# Patient Record
Sex: FEMALE | Race: WHITE | Employment: FULL TIME | URBAN - METROPOLITAN AREA
[De-identification: names, ages, dates, MRNs, and addresses within clinical notes are randomized per-mention and may not be internally consistent; named-entity substitution may affect disease eponyms.]

---

## 2019-07-31 ENCOUNTER — TELEPHONE (OUTPATIENT)
Dept: NEUROLOGY | Facility: CLINIC | Age: 44
End: 2019-07-31

## 2019-08-13 ENCOUNTER — TELEPHONE (OUTPATIENT)
Dept: NEUROLOGY | Facility: CLINIC | Age: 44
End: 2019-08-13

## 2019-08-15 NOTE — TELEPHONE ENCOUNTER
Patient left message on voicemail stating she speaks to Dr. Angel Wilder on his cell phone weekly and Dr. Angel Wilder is not sure why office is not calling back to schedule a sooner apt.  Patient expressed she has called the office multiple times and left 3 messages for

## 2019-08-27 ENCOUNTER — TELEPHONE (OUTPATIENT)
Dept: NEUROLOGY | Facility: CLINIC | Age: 44
End: 2019-08-27

## 2019-08-27 ENCOUNTER — OFFICE VISIT (OUTPATIENT)
Dept: NEUROLOGY | Facility: CLINIC | Age: 44
End: 2019-08-27
Payer: COMMERCIAL

## 2019-08-27 VITALS
RESPIRATION RATE: 16 BRPM | HEART RATE: 72 BPM | HEIGHT: 66 IN | WEIGHT: 150 LBS | BODY MASS INDEX: 24.11 KG/M2 | SYSTOLIC BLOOD PRESSURE: 100 MMHG | DIASTOLIC BLOOD PRESSURE: 68 MMHG

## 2019-08-27 DIAGNOSIS — M54.16 LUMBAR RADICULOPATHY: Primary | ICD-10-CM

## 2019-08-27 DIAGNOSIS — M51.26 LUMBAR HERNIATED DISC: ICD-10-CM

## 2019-08-27 DIAGNOSIS — M51.9 LUMBAR DISC DISEASE: ICD-10-CM

## 2019-08-27 PROCEDURE — 99244 OFF/OP CNSLTJ NEW/EST MOD 40: CPT | Performed by: PHYSICAL MEDICINE & REHABILITATION

## 2019-08-27 RX ORDER — GABAPENTIN 800 MG/1
800 TABLET ORAL
COMMUNITY
Start: 2019-07-30 | End: 2020-06-23

## 2019-08-27 RX ORDER — DEXLANSOPRAZOLE 60 MG/1
60 CAPSULE, DELAYED RELEASE ORAL DAILY
COMMUNITY
End: 2020-06-23

## 2019-08-27 RX ORDER — HYDROCODONE BITARTRATE AND ACETAMINOPHEN 5; 325 MG/1; MG/1
1 TABLET ORAL EVERY 6 HOURS PRN
COMMUNITY
Start: 2019-07-30 | End: 2020-06-23

## 2019-08-27 NOTE — PATIENT INSTRUCTIONS
Plan  She will take 400 mg of the Neurontin at night time for the ain and insomnia. I will do a left S1 TFESI. She will need to get back into the PT once she has had the injection. She will continue with the Norco as needed.     The patient will

## 2019-08-27 NOTE — TELEPHONE ENCOUNTER
Patient has been scheduled for a Left S1 TFESI under local on 9/10/19 at the [facility]. Medications and allergies reviewed. Patient informed to hold aspirins, nsaids, blood thinners, vitamins and fish oils 3-7 days prior to procedure.  Patient informed to hold m

## 2019-08-27 NOTE — PROGRESS NOTES
Low Back Pain H & P    Chief Complaint:  Patient presents with:  Low Back Pain: Patient presents for low back pain for the past year and half, denied any injury.  Patient c/o pain on both sides of back, sitting worsens pain, radiates down back and inner lef helped the left leg pain but it made her angry. She stopped taking it about 1 week ago. She also had memory issues with it. She would have GI upset with the Norco which she has taken when the pain is really bad.   On July 30th, she saw Dr. 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        The patient does live in a home with stairs. Review of Systems  Constitutional:   Negative for chills, fatigue, fever, night sweats, but weight gain of 60 lbs over the last year. She has had some sweating.   ENT:   Negative for hearing lo Non-tender for all Spinous Processes   Z-joints: Non-tender for all Z-joints   SIJ: Non-tender for bilateral SIJ   Piriformis Muscle: Non-tender bilateral Piriformis muscles   Greater Trochanteric Bursa: Non-tender for bilateral Greater Trochanteric Bursa Radiology Imaging:  I reviewed her MRI of the lumbar spine from 7/16/19. Assessment  1. left S1 radiculopathy    2. L5-S1 left moderate herniated disc    3.  L3-4 mild central, L4-5 mild-mod central bulging discs      Plan  She will take 400 mg of

## 2019-08-27 NOTE — TELEPHONE ENCOUNTER
Called  [facility] for authorization of approval of Left S1 TFESI cpt codes [id number], [id number]. Talked to Taty Felder.  who states no authorization is required. Reference #  [id number]. Will  inform Nursing.

## 2019-08-28 ENCOUNTER — TELEPHONE (OUTPATIENT)
Dept: NEUROLOGY | Facility: CLINIC | Age: 44
End: 2019-08-28

## 2019-08-28 NOTE — TELEPHONE ENCOUNTER
Patient left voicemail requesting appt this Friday 8/30/2019 for left S1 TFESI.   Patient is currently scheduled 9/10/2019  There are no openings on 8/30/19    Will notify patient tomorrow

## 2019-08-30 ENCOUNTER — MED REC SCAN ONLY (OUTPATIENT)
Dept: NEUROLOGY | Facility: CLINIC | Age: 44
End: 2019-08-30

## 2019-09-10 ENCOUNTER — OFFICE VISIT (OUTPATIENT)
Dept: SURGERY | Facility: CLINIC | Age: 44
End: 2019-09-10
Payer: COMMERCIAL

## 2019-09-10 DIAGNOSIS — M51.26 LUMBAR HERNIATED DISC: ICD-10-CM

## 2019-09-10 DIAGNOSIS — M54.16 LUMBAR RADICULOPATHY: Primary | ICD-10-CM

## 2019-09-10 PROCEDURE — 64483 NJX AA&/STRD TFRM EPI L/S 1: CPT | Performed by: PHYSICAL MEDICINE & REHABILITATION

## 2019-09-10 NOTE — PROCEDURES
Andrea GANDHI 7.    LUMBAR TRANSFORAMINAL   NAME:  Yasir Young    MR #:    [id number] :  1975     PHYSICIAN:  Rosanna Anderson        Operative Report    DATE OF PROCEDURE: 9/10/2019   PREOPERATIVE DIAGNOSES: 1. left S1 radiculopath aspiration was performed. No blood, fluid, or air was aspirated at anytime.

## 2020-05-12 ENCOUNTER — TELEPHONE (OUTPATIENT)
Dept: PHYSICAL THERAPY | Facility: HOSPITAL | Age: 45
End: 2020-05-12

## 2020-05-12 ENCOUNTER — TELEPHONE (OUTPATIENT)
Dept: PHYSICAL THERAPY | Age: 45
End: 2020-05-12

## 2020-05-12 NOTE — TELEPHONE ENCOUNTER
Returned pt's call. Scheduled visits for PT at [place] [address]. She states that she will contact Dr. Anne Beltran to request an updated script prior to her PT eval on 5/18/20.

## 2020-05-13 ENCOUNTER — TELEMEDICINE (OUTPATIENT)
Dept: NEUROLOGY | Facility: CLINIC | Age: 45
End: 2020-05-13
Payer: COMMERCIAL

## 2020-05-13 ENCOUNTER — ORDER TRANSCRIPTION (OUTPATIENT)
Dept: PHYSICAL THERAPY | Age: 45
End: 2020-05-13

## 2020-05-13 DIAGNOSIS — M54.16 LUMBAR RADICULOPATHY: ICD-10-CM

## 2020-05-13 DIAGNOSIS — M51.26 LUMBAR HERNIATED DISC: ICD-10-CM

## 2020-05-13 DIAGNOSIS — M51.26 LUMBAR DISC HERNIATION: ICD-10-CM

## 2020-05-13 DIAGNOSIS — M54.2 NECK PAIN ON RIGHT SIDE: ICD-10-CM

## 2020-05-13 DIAGNOSIS — M51.9 LUMBAR DISC DISEASE: ICD-10-CM

## 2020-05-13 DIAGNOSIS — M51.16 RADICULOPATHY DUE TO DISORDER OF INTERVERTEBRAL DISC OF LUMBAR SPINE: ICD-10-CM

## 2020-05-13 DIAGNOSIS — M54.16 LUMBAR RADICULOPATHY: Primary | ICD-10-CM

## 2020-05-13 PROCEDURE — 99214 OFFICE O/P EST MOD 30 MIN: CPT | Performed by: PHYSICAL MEDICINE & REHABILITATION

## 2020-05-13 NOTE — PATIENT INSTRUCTIONS
PLAN:   She will start PT on the lumbar and the cervical spines. Since the cervical spine pain is more mild, I will have her try the PT before having her get cervical spine x-rays.   If the PT is not helping her, then she will need to have the x-rays and

## 2020-05-13 NOTE — PROGRESS NOTES
Renetta [address]anilla 89  [facility]    Telemedicine Visit - Evaluation    Mindy Guidry verbally consents to a Telemedicine Visit on 05/13/20. This visit is conducted using Telemedicine with live, interactive audio and video.     Maryjo SURGICAL HISTORY:     Past Surgical History:   Procedure Laterality Date   • D & C  2000   • D & C  2007         CURRENT MEDICATIONS:     Current Outpatient Medications   Medication Sig Dispense Refill   • Dexlansoprazole 60 MG Oral Capsule Delayed Release difficulty   LEFT Walking on Heels: no difficulty     Lumbar Spine:    Scoliosis: No scoliosis present   Lumbar Flexion: 80 degrees Gives the patient pain in the bilateral low back.    Lumbar Extension: 20 degrees Relieves pain     Special Tests Lumbar Spin on right side        PLAN:   She will start PT on the lumbar and the cervical spines. Since the cervical spine pain is more mild, I will have her try the PT before having her get cervical spine x-rays.   If the PT is not helping her, then she will need t platform. The patient was made aware of where to find [facility] notice of privacy practices, telehealth consent form and other related consent forms and documents. which are located on the [facility] website.  The patient verbally agreed to telehealth consent form,

## 2020-05-18 ENCOUNTER — OFFICE VISIT (OUTPATIENT)
Dept: PHYSICAL THERAPY | Age: 45
End: 2020-05-18
Attending: PHYSICAL MEDICINE & REHABILITATION
Payer: COMMERCIAL

## 2020-05-18 DIAGNOSIS — M54.16 LUMBAR RADICULOPATHY: ICD-10-CM

## 2020-05-18 DIAGNOSIS — M51.9 LUMBAR DISC DISEASE: ICD-10-CM

## 2020-05-18 DIAGNOSIS — M51.26 LUMBAR DISC HERNIATION: ICD-10-CM

## 2020-05-18 PROCEDURE — 97530 THERAPEUTIC ACTIVITIES: CPT

## 2020-05-18 PROCEDURE — 97110 THERAPEUTIC EXERCISES: CPT

## 2020-05-18 PROCEDURE — 97163 PT EVAL HIGH COMPLEX 45 MIN: CPT

## 2020-05-18 NOTE — PROGRESS NOTES
SPINE EVALUATION:   Referring Physician: Dr. Jennifer Weston  Diagnosis:   Lumbar radiculopathy (M54.16)  Lumbar disc disease (M51.9)  Lumbar herniated disc (M51.26)  Neck pain on right side (M54.2) Date of Service: 5/18/2020     PATIENT SUMMARY   [facility] is a feels stiff. Last year, she would have numbness that would shoot down to her fingers. She denies radiating symptoms down her arms currently. She reports that she has a hx of migraines for many years.  The migraines stopped about 2-3 years ago, and have rece posture and muscle imbalance. She also appears to present with signs and symptoms that may be consistent with R sacroiliac joint dysfunction. Pt and PT discussed evaluation findings, pathology, POC and HEP.   Pt voiced understanding and performs HEP correc pt ambulates on level ground with decreased hip extension      Today’s Treatment and Response: following repeated lumbar ext, pt demonstrated increased lumbar flexion ROM with decreased pain.  Following cervical retraction, pt showed increased B cervical ro 8 visits over a 90 day period.  Treatment will include: Gait training, Manual Therapy, Neuromuscular Re-education, Self-Care Home Management, Therapeutic Activities, Therapeutic Exercise, Home Exercise Program instruction and Modalities to include: Electric

## 2020-05-21 ENCOUNTER — APPOINTMENT (OUTPATIENT)
Dept: PHYSICAL THERAPY | Age: 45
End: 2020-05-21
Attending: PHYSICAL MEDICINE & REHABILITATION
Payer: COMMERCIAL

## 2020-05-22 ENCOUNTER — OFFICE VISIT (OUTPATIENT)
Dept: PHYSICAL THERAPY | Age: 45
End: 2020-05-22
Attending: PHYSICAL MEDICINE & REHABILITATION
Payer: COMMERCIAL

## 2020-05-22 PROCEDURE — 97110 THERAPEUTIC EXERCISES: CPT

## 2020-05-22 PROCEDURE — 97112 NEUROMUSCULAR REEDUCATION: CPT

## 2020-05-22 PROCEDURE — 97140 MANUAL THERAPY 1/> REGIONS: CPT

## 2020-05-22 NOTE — PROGRESS NOTES
Dx:    Lumbar radiculopathy (M54.16)  Lumbar disc disease (M51.9)  Lumbar herniated disc (M51.26)  Neck pain on right side (M54.2)      Insurance (Authorized # of Visits):  BCBS PPO; 8 visits; Cert ends 2/21/0313       Authorizing Physician: Dr. Kevon Dumont diaphragmatic breathing training x 6 min  TrA activation with cueing x 5 min  Gait training with cues to activate TrA and glutes                HEP: prone press ups; hip add ball; hip abd tband    Charges: 1 TE, 1 MT, 1 NMR     Total Timed Treatment: 45 mi

## 2020-05-28 ENCOUNTER — OFFICE VISIT (OUTPATIENT)
Dept: PHYSICAL THERAPY | Age: 45
End: 2020-05-28
Attending: INTERNAL MEDICINE
Payer: COMMERCIAL

## 2020-05-28 PROCEDURE — 97140 MANUAL THERAPY 1/> REGIONS: CPT

## 2020-05-28 PROCEDURE — 97112 NEUROMUSCULAR REEDUCATION: CPT

## 2020-05-28 PROCEDURE — 97110 THERAPEUTIC EXERCISES: CPT

## 2020-05-28 NOTE — PROGRESS NOTES
Dx:    Lumbar radiculopathy (M54.16)  Lumbar disc disease (M51.9)  Lumbar herniated disc (M51.26)  Neck pain on right side (M54.2)      Insurance (Authorized # of Visits):  BCBS PPO; 8 visits; Cert ends 3/88/2196       Authorizing Physician: Dr. Rachael Sheets MD TX#: 4/ Date:                 TX#: 5/ Date:    Tx#: 6/   There Ex  Prone press ups; 10x2  Hip add ball 10x5\", 2 sets  Hip abd tband: 10x2 There Ex  Prone press ups; 10x1  Prone press ups with \"sag\" 10x1  Hip add ball 10x5\", 2 sets  Hip ab

## 2020-06-01 ENCOUNTER — OFFICE VISIT (OUTPATIENT)
Dept: PHYSICAL THERAPY | Age: 45
End: 2020-06-01
Attending: PHYSICAL MEDICINE & REHABILITATION
Payer: COMMERCIAL

## 2020-06-01 PROCEDURE — 97110 THERAPEUTIC EXERCISES: CPT

## 2020-06-01 PROCEDURE — 97140 MANUAL THERAPY 1/> REGIONS: CPT

## 2020-06-01 NOTE — PROGRESS NOTES
Dx:    Lumbar radiculopathy (M54.16)  Lumbar disc disease (M51.9)  Lumbar herniated disc (M51.26)  Neck pain on right side (M54.2)      Insurance (Authorized # of Visits):  BCBS PPO; 8 visits; Cert ends 6/33/5679       Authorizing Physician: Dr. Jocy Gan MD working progress. Plan: Focus on pain reduction and continue with core stabilization. Date: 5/22/2020  TX#: 2/8 Date: 5/28/2020                TX#: 3/8 Date:  6/1/2020               TX#: 4/8 Date:                 TX#: 5/ Date:    Tx#: 6/ There Ex  Pr

## 2020-06-02 ENCOUNTER — APPOINTMENT (OUTPATIENT)
Dept: PHYSICAL THERAPY | Age: 45
End: 2020-06-02
Payer: COMMERCIAL

## 2020-06-05 ENCOUNTER — OFFICE VISIT (OUTPATIENT)
Dept: PHYSICAL THERAPY | Age: 45
End: 2020-06-05
Attending: INTERNAL MEDICINE
Payer: COMMERCIAL

## 2020-06-05 PROCEDURE — 97110 THERAPEUTIC EXERCISES: CPT

## 2020-06-05 PROCEDURE — 97140 MANUAL THERAPY 1/> REGIONS: CPT

## 2020-06-05 NOTE — PROGRESS NOTES
Dx:    Lumbar radiculopathy (M54.16)  Lumbar disc disease (M51.9)  Lumbar herniated disc (M51.26)  Neck pain on right side (M54.2)      Insurance (Authorized # of Visits):  BCBS PPO; 8 visits; Cert ends 5/11/3513       Authorizing Physician: Dr. Viann Meckel, MD (C7): R 5/5, L 5/5  Wrist Flex (C7): R 5/5, L 5/5  Digit Flex (C8): R 5/5, L 5/5  Thumb Ext (C8): R 5/5, L 4/5  Interossei (T1): R 5/5, L 4/5     Rhomboids: R 4/5, L 4/5  Mid trap: R 3/5; L 4/5  Lats: R NT/5, L NT/5  Low trap: R NT/5; L NT/5    Hip flexion had demonstrated signs of lumbar extension rotation syndrome with c/o pain. When providing manual cues to attain neutral lumbopelvic alignment, pt is able to perform exercises and ambulate without pain.  Her cervical symptoms have improved but she does demo sign and return this letter via fax as soon as possible to [phone]. I certify the need for these services furnished under this plan of treatment and while under my care.     X___________________________________________________ Date___________________ and glutes  Supine: BKFO 20x            HEP: prone press ups; hip add ball; hip abd tband; BKFO; sciatic n.  Neuromob; bridge progression    Charges: 2 TE, 1 MT    Total Timed Treatment: 45 min  Total Treatment Time: 45 min

## 2020-06-08 ENCOUNTER — OFFICE VISIT (OUTPATIENT)
Dept: PHYSICAL THERAPY | Age: 45
End: 2020-06-08
Attending: PHYSICAL MEDICINE & REHABILITATION
Payer: COMMERCIAL

## 2020-06-08 PROCEDURE — 97110 THERAPEUTIC EXERCISES: CPT

## 2020-06-08 PROCEDURE — 97140 MANUAL THERAPY 1/> REGIONS: CPT

## 2020-06-08 NOTE — PROGRESS NOTES
Dx:    Lumbar radiculopathy (M54.16)  Lumbar disc disease (M51.9)  Lumbar herniated disc (M51.26)  Neck pain on right side (M54.2)      Insurance (Authorized # of Visits):  BCBS PPO; 13 visits; Cert ends 4/2/6187       Authorizing Physician: Dr. Jennifer Weston MD 5/5  Knee Flexion: R 4/5; L 5/5            Knee extension (L3): R 4+/5; L 5/5            DF (L4): R 4/5; L 5/5  Great Toe Ext (L5): R 4/5, L 5/5  PF (S1): R 4+/5; L 5/5      Strength: (* denotes performed with pain)  UE/Scapular 6/5/2020 LE 6/5/2020   Shou TX#: 5/8 Date: 6/8/2020  Tx#: 6/15   There Ex  Prone press ups; 10x2  Hip add ball 10x5\", 2 sets  Hip abd tband: 10x2 There Ex  Prone press ups; 10x1  Prone press ups with \"sag\" 10x1  Hip add ball 10x5\", 2 sets  Hip abd tband: 10x2  Sciatic N.  Neuro ups; hip add ball; hip abd tband; BKFO; sciatic n.  Neuromob; bridge progression    Charges: 2 TE, 1 MT    Total Timed Treatment: 45 min  Total Treatment Time: 45 min

## 2020-06-09 ENCOUNTER — APPOINTMENT (OUTPATIENT)
Dept: PHYSICAL THERAPY | Age: 45
End: 2020-06-09
Payer: COMMERCIAL

## 2020-06-11 ENCOUNTER — OFFICE VISIT (OUTPATIENT)
Dept: PHYSICAL THERAPY | Age: 45
End: 2020-06-11
Attending: PHYSICAL MEDICINE & REHABILITATION
Payer: COMMERCIAL

## 2020-06-11 PROCEDURE — 97110 THERAPEUTIC EXERCISES: CPT

## 2020-06-11 PROCEDURE — 97140 MANUAL THERAPY 1/> REGIONS: CPT

## 2020-06-11 NOTE — PROGRESS NOTES
Dx:    Lumbar radiculopathy (M54.16)  Lumbar disc disease (M51.9)  Lumbar herniated disc (M51.26)  Neck pain on right side (M54.2)      Insurance (Authorized # of Visits):  BCBS PPO; 13 visits; Cert ends 6/6/0539       Authorizing Physician: Dr. Tae Valdes MD 5/5  Shoulder ABD (C5): R 5/5, L 5/5  Biceps (C6): R 5/5, L 5/5  Wrist ext (C6): R 5/5, L 5/5  Triceps (C7): R 5/5, L 5/5  Wrist Flex (C7): R 5/5, L 5/5  Digit Flex (C8): R 5/5, L 5/5  Thumb Ext (C8): R 5/5, L 4/5  Interossei (T1): R 5/5, L 4/5     Rhomboi ball with bridging 10x5\", 2 sets  Hip abd tband: 10x2  Supine: Sciatic N.  Neuromob 20xB Ther Ex  Prone: glut med heel squeeze 10x2  Prone press ups with \"sag\" 10x1  Prone press ups with therapist OP 10x1  Hip add ball with bridging 10x5\", 2 sets  Hip a Treatment Time: 45 min

## 2020-06-15 ENCOUNTER — OFFICE VISIT (OUTPATIENT)
Dept: PHYSICAL THERAPY | Age: 45
End: 2020-06-15
Attending: PHYSICAL MEDICINE & REHABILITATION
Payer: COMMERCIAL

## 2020-06-15 PROCEDURE — 97110 THERAPEUTIC EXERCISES: CPT

## 2020-06-15 PROCEDURE — 97140 MANUAL THERAPY 1/> REGIONS: CPT

## 2020-06-18 ENCOUNTER — OFFICE VISIT (OUTPATIENT)
Dept: PHYSICAL THERAPY | Age: 45
End: 2020-06-18
Attending: PHYSICAL MEDICINE & REHABILITATION
Payer: COMMERCIAL

## 2020-06-18 PROCEDURE — 97110 THERAPEUTIC EXERCISES: CPT

## 2020-06-18 PROCEDURE — 97112 NEUROMUSCULAR REEDUCATION: CPT

## 2020-06-18 NOTE — PROGRESS NOTES
Dx:    Lumbar radiculopathy (M54.16)  Lumbar disc disease (M51.9)  Lumbar herniated disc (M51.26)  Neck pain on right side (M54.2)      Insurance (Authorized # of Visits):  BCBS PPO; 13 visits; Cert ends 2/6/6275       Authorizing Physician: Dr. Marli Guzman MD R 4/5, L 5/5  PF (S1): R 4+/5; L 5/5      Strength: (* denotes performed with pain)  UE/Scapular 6/5/2020 LE 6/5/2020   Shoulder Flex: R 5/5, L 5/5  Shoulder ABD (C5): R 5/5, L 5/5  Biceps (C6): R 5/5, L 5/5  Wrist ext (C6): R 5/5, L 5/5  Triceps (C7): R 5 6/5/2020: reviewed goals with pt. Goals are in working progress. Plan: Progress core/scapular stabilization exercises.      Date: 6/5/2020            TX#: 5/8 Date: 6/8/2020  Tx#: 6/13 Date: 6/11/2020  Tx#: 7/13 Date: 6/15/2020  Tx#: 8/13 Date: 6/18/ thoracic mobs x 4 min Manual Therapy  Supine: MFR at Cervical UT/scalenes and suboccipital release; CT junction mob to improve rot x 6 min  Supine: cervical distraction and lower cervical sideglide mob x 3 min  MFR at lumbar paraspinals x 5 min  Prone: P/A

## 2020-06-22 ENCOUNTER — OFFICE VISIT (OUTPATIENT)
Dept: PHYSICAL THERAPY | Age: 45
End: 2020-06-22
Attending: PHYSICAL MEDICINE & REHABILITATION
Payer: COMMERCIAL

## 2020-06-22 PROCEDURE — 97110 THERAPEUTIC EXERCISES: CPT

## 2020-06-22 PROCEDURE — 97112 NEUROMUSCULAR REEDUCATION: CPT

## 2020-06-22 NOTE — PROGRESS NOTES
Dx:    Lumbar radiculopathy (M54.16)  Lumbar disc disease (M51.9)  Lumbar herniated disc (M51.26)  Neck pain on right side (M54.2)      Insurance (Authorized # of Visits):  BCBS PPO; 13 visits; Cert ends 5/2/9073       Authorizing Physician: Dr. Daniel Ramirez MD NT/5, L NT/5  Low trap: R NT/5; L NT/5    Hip flexion (L2): R 5/5; L 5/5  Hip abduction: R 4+/5; L 5/5  Hip Extension: R 4 -/5; L 4+/5            Hip ER: R 5/5; L 5/5  Hip IR: R 5/5; L 5/5  Knee Flexion: R 5/5; L 5/5            Knee extension (L3): R 5/5; 9/10 which limits her ability to focus and she needs to lay down and take medication to manage the migraines. She states that she is schedule to see Dr. Morgan Bartlett, neurologist, at [facility] in Sept. 2020.  In conjunction with PT at [facility] with me of these findings, precautions, and treatment options and has agreed to actively participate in planning and for this course of care. Thank you for your referral. If you have any questions, please contact me at Dept: [phone].     Sincerely,  Electro 10x2  Sidelying hip circles: 10x2  Quadruped: cat/cow 10x1  Quadruped: threading the needle 5x1  Prone: hip ext with knee bent 10x2  Sitting: cervical retractions 10x1  Sitting: chin tucks 10x2   Manual Therapy  MFR at lumbar paraspinals x 6 min  Prone: P/

## 2020-06-23 ENCOUNTER — OFFICE VISIT (OUTPATIENT)
Dept: NEUROLOGY | Facility: CLINIC | Age: 45
End: 2020-06-23
Payer: COMMERCIAL

## 2020-06-23 VITALS — BODY MASS INDEX: 24.91 KG/M2 | HEIGHT: 66 IN | WEIGHT: 155 LBS

## 2020-06-23 DIAGNOSIS — M54.16 LUMBAR RADICULOPATHY: Primary | ICD-10-CM

## 2020-06-23 DIAGNOSIS — M51.9 LUMBAR DISC DISEASE: ICD-10-CM

## 2020-06-23 DIAGNOSIS — M54.2 NECK PAIN ON RIGHT SIDE: ICD-10-CM

## 2020-06-23 DIAGNOSIS — M51.26 LUMBAR HERNIATED DISC: ICD-10-CM

## 2020-06-23 PROCEDURE — 99214 OFFICE O/P EST MOD 30 MIN: CPT | Performed by: PHYSICAL MEDICINE & REHABILITATION

## 2020-06-23 RX ORDER — LANSOPRAZOLE 30 MG/1
1 TABLET, ORALLY DISINTEGRATING, DELAYED RELEASE ORAL DAILY
COMMUNITY
Start: 2020-05-10

## 2020-06-23 NOTE — PROGRESS NOTES
Low Back Pain H & P    Chief Complaint: Patient presents with:  Low Back Pain: LOV: 5/13/20. F/U on back and neck pain. Patient states that her lower back is doing better with PT, now pain is more so on right side than left side.  She continues to have pain Hypertension Father    • Hypertension Mother    • Diabetes Mother    • Diabetes Maternal Grandmother        Social History   Social History    Socioeconomic History      Marital status:       Spouse name: Not on file      Number of children: Not on Bike Helmet: Not Asked        Seat Belt: Not Asked        Self-Exams: Not Asked    Social History Narrative      The patient uses the following assistive device(s):  quad cane. The patient does live in a home with stairs.       Review of Systems thigh  lateral  medial  Left Lower Leg  arch of the LEFT foot  lateral LEFT foot  first dorsal web space of the LEFT foot   LE Muscle Strength:  All LE strength measurements 5/5   RIGHT plantar reflexes: downward response   LEFT plantar reflexes: downward r

## 2020-06-23 NOTE — PATIENT INSTRUCTIONS
Plan  She will finish off the PT. The patient will continue with her home exercise program.    The patient does not need any injections at this time. The patient does not need any pain medications at this time.     She will follow up in 2-3 months o

## 2020-06-25 ENCOUNTER — APPOINTMENT (OUTPATIENT)
Dept: PHYSICAL THERAPY | Age: 45
End: 2020-06-25
Attending: PHYSICAL MEDICINE & REHABILITATION
Payer: COMMERCIAL

## 2020-06-25 PROCEDURE — 97110 THERAPEUTIC EXERCISES: CPT

## 2020-06-26 NOTE — PROGRESS NOTES
Dx:    Lumbar radiculopathy (M54.16)  Lumbar disc disease (M51.9)  Lumbar herniated disc (M51.26)  Neck pain on right side (M54.2)      Insurance (Authorized # of Visits):  BCBS PPO; 13 visits; Cert ends 2/1/0496       Authorizing Physician: Dr. Roma Fuentes MD 5/5      Strength: (* denotes performed with pain)  UE/Scapular 6/22/2020 LE 6/22/2020   Shoulder Flex: R 5/5, L 5/5  Shoulder ABD (C5): R 5/5, L 5/5  Biceps (C6): R 5/5, L 5/5  Wrist ext (C6): R 5/5, L 5/5  Triceps (C7): R 5/5, L 5/5  Wrist Flex (C7): R 5 minutes to resolve)  5. Patient will have 5/5 B LE strength to rise up from a seat without provocation of lumbar symptoms. (Partially Met)  6/22/2020: reviewed goals with pt. Goals are in working progress. Plan: Decreased freq of tx to 1x/week.  Progre min       Neuro Re-ed   Diaphragmatic breathing 3D x 15 min Neuro Re-ed   Diaphragmatic breathing 3D x 10 min          HEP: prone press ups; hip add ball; hip abd tband; BKFO; sciatic n. Neuromob; bridge progression; breathing 3D sequence;  Sidelying hip se

## 2020-06-29 ENCOUNTER — APPOINTMENT (OUTPATIENT)
Dept: PHYSICAL THERAPY | Age: 45
End: 2020-06-29
Attending: PHYSICAL MEDICINE & REHABILITATION
Payer: COMMERCIAL

## 2020-07-02 ENCOUNTER — OFFICE VISIT (OUTPATIENT)
Dept: PHYSICAL THERAPY | Age: 45
End: 2020-07-02
Attending: PHYSICAL MEDICINE & REHABILITATION
Payer: COMMERCIAL

## 2020-07-02 PROCEDURE — 97110 THERAPEUTIC EXERCISES: CPT

## 2020-07-02 NOTE — PROGRESS NOTES
Dx:    Lumbar radiculopathy (M54.16)  Lumbar disc disease (M51.9)  Lumbar herniated disc (M51.26)  Neck pain on right side (M54.2)      Insurance (Authorized # of Visits):  BCBS PPO; 13 visits; Cert ends 0/2/7043       Authorizing Physician: Dr. Brianne Vincent MD Knee extension (L3): R 5/5; L 5/5            DF (L4): R 5/5; L 5/5  Great Toe Ext (L5): R 5/5, L 5/5  PF (S1): R 5/5; L 5/5      Strength: (* denotes performed with pain)  UE/Scapular 6/22/2020 LE 6/22/2020   Shoulder Flex: R 5/5, L 5/5  Shoulder initial back pain when she gets out of the car, taking about 5 minutes to resolve)  5. Patient will have 5/5 B LE strength to rise up from a seat without provocation of lumbar symptoms. (Partially Met)  7/2/2020: reviewed goals with pt.  Goals are in workin with knee bent 10x  Prone: hip IR/ER chantelle wipers 10x  Prone: thoracic ext 10x1  Prone: lumbar ext with pt \"sag\" 10x  Sitting: cervical retractions with OP 10x1  Sitting: cervical retraction with ext 2x5x  Standing: scap rows red tband 20x  Standing

## 2020-07-02 NOTE — PATIENT INSTRUCTIONS
Access Code: [email or web address]   URL: [email or web address]   Date: 07/02/2020   Prepared by: Khanh Luis     Exercises  Standing Shoulder Row with Anchored Resistance - 20 reps - 1 sets - 1x daily - 3x weekly  Shoulder Extension with Res

## 2020-07-06 ENCOUNTER — APPOINTMENT (OUTPATIENT)
Dept: PHYSICAL THERAPY | Age: 45
End: 2020-07-06
Attending: PHYSICAL MEDICINE & REHABILITATION
Payer: COMMERCIAL

## 2020-07-09 ENCOUNTER — OFFICE VISIT (OUTPATIENT)
Dept: PHYSICAL THERAPY | Age: 45
End: 2020-07-09
Attending: PHYSICAL MEDICINE & REHABILITATION
Payer: COMMERCIAL

## 2020-07-09 PROCEDURE — 97110 THERAPEUTIC EXERCISES: CPT

## 2020-07-09 NOTE — PROGRESS NOTES
Dx:    Lumbar radiculopathy (M54.16)  Lumbar disc disease (M51.9)  Lumbar herniated disc (M51.26)  Neck pain on right side (M54.2)      Insurance (Authorized # of Visits):  BCBS PPO; 13 visits; Cert ends 4/9/5414       Authorizing Physician: Dr. Ashanti Shirley MD L NT/5    Hip flexion (L2): R 5/5; L 5/5  Hip abduction: R 4+/5; L 5/5  Hip Extension: R 4 -/5; L 4+/5            Hip ER: R 5/5; L 5/5  Hip IR: R 5/5; L 5/5  Knee Flexion: R 5/5; L 5/5            Knee extension (L3): R 5/5; L 5/5            DF (L4): R 5/5; inconsistent and she will see a neurologist in 9/2020. She has shown some improvements in her hip strength and parascapular strength. Cervical ROM has improved to [facility]. Rotator cuff/scapular stabilization exercises were initiated last week.  She will continu therapist: Kota Gates PT     Physician's certification required:  Yes  Please co-sign or sign and return this letter via fax as soon as possible to [phone].    I certify the need for these services furnished under this plan of treatment and i add ball; hip abd tband; BKFO; sciatic n. Neuromob; bridge progression; breathing 3D sequence;  Sidelying hip series    Charges: 3 TE    Total Timed Treatment: 45 min  Total Treatment Time: 45 min

## 2020-07-13 ENCOUNTER — APPOINTMENT (OUTPATIENT)
Dept: PHYSICAL THERAPY | Age: 45
End: 2020-07-13
Attending: PHYSICAL MEDICINE & REHABILITATION
Payer: COMMERCIAL

## 2020-07-16 ENCOUNTER — OFFICE VISIT (OUTPATIENT)
Dept: PHYSICAL THERAPY | Age: 45
End: 2020-07-16
Attending: PHYSICAL MEDICINE & REHABILITATION
Payer: COMMERCIAL

## 2020-07-16 PROCEDURE — 97110 THERAPEUTIC EXERCISES: CPT

## 2020-07-16 PROCEDURE — 97140 MANUAL THERAPY 1/> REGIONS: CPT

## 2020-07-16 NOTE — PROGRESS NOTES
Dx:    Lumbar radiculopathy (M54.16)  Lumbar disc disease (M51.9)  Lumbar herniated disc (M51.26)  Neck pain on right side (M54.2)      Insurance (Authorized # of Visits):  BCBS PPO; 13 visits; Cert ends 7/1/2361       Authorizing Physician: Dr. Spenser Pool MD 5/5  Great Toe Ext (L5): R 5/5, L 5/5  PF (S1): R 5/5; L 5/5      Strength: (* denotes performed with pain)  UE/Scapular 6/22/2020 LE 6/22/2020   Shoulder Flex: R 5/5, L 5/5  Shoulder ABD (C5): R 5/5, L 5/5  Biceps (C6): R 5/5, L 5/5  Wrist ext (C6): R 5/5 out of the car, taking about 5 minutes to resolve)  5. Patient will have 5/5 B LE strength to rise up from a seat without provocation of lumbar symptoms. (Partially Met)  7/2/2020: reviewed goals with pt. Goals are in working progress.       Plan: Focus on hands behind UT: 10x1     Therapeutic Activity  Pt education - ergonomic sitting at work desk; encouraged neutral spine/avoid lumbar ext/rot       Manual Therapy  Prone: P/A mob at lumbar/thoracic  Prone: upper thoracic gapping  Prone: MFR at B lumbar x 6

## 2020-07-23 ENCOUNTER — OFFICE VISIT (OUTPATIENT)
Dept: PHYSICAL THERAPY | Age: 45
End: 2020-07-23
Attending: PHYSICAL MEDICINE & REHABILITATION
Payer: COMMERCIAL

## 2020-07-23 PROCEDURE — 97140 MANUAL THERAPY 1/> REGIONS: CPT

## 2020-07-23 PROCEDURE — 97110 THERAPEUTIC EXERCISES: CPT

## 2020-07-23 NOTE — PROGRESS NOTES
Dx:    Lumbar radiculopathy (M54.16)  Lumbar disc disease (M51.9)  Lumbar herniated disc (M51.26)  Neck pain on right side (M54.2)      Insurance (Authorized # of Visits):  BCBS PPO; 13 visits; Cert ends 5/4/0928       Authorizing Physician: Dr. Rasta Topete MD 5/5  Great Toe Ext (L5): R 5/5, L 5/5  PF (S1): R 5/5; L 5/5      Strength: (* denotes performed with pain)  UE/Scapular 7/23/2020 LE 7/23/2020   Shoulder Flex: R 5/5, L 5/5  Shoulder ABD (C5): R 5/5, L 5/5  Biceps (C6): R 5/5, L 5/5  Wrist ext (C6): R 5/5 5/5 B LE strength to rise up from a seat without provocation of lumbar symptoms. (Partially Met)  7/2/2020: reviewed goals with pt. Goals are in working progress. Plan: Focus on progressive progress core and scapular stabilization.      Date: 6/25/2020 ext in prone with sag: 10x1  Quadruped on forearm, head rolls: 10x1  Quadruped alt UE 10x1  Quadruped alt LE: 10x1  Child's pose: 20\"x2  Threading the needle: 5xB  Standing: UT shoulder shrugs 10x5\"  Standing: lower trap \"y's\": 20x     Therapeutic Acti

## 2020-07-30 ENCOUNTER — APPOINTMENT (OUTPATIENT)
Dept: PHYSICAL THERAPY | Age: 45
End: 2020-07-30
Attending: PHYSICAL MEDICINE & REHABILITATION
Payer: COMMERCIAL

## 2020-08-06 ENCOUNTER — TELEPHONE (OUTPATIENT)
Dept: PHYSICAL THERAPY | Age: 45
End: 2020-08-06

## 2020-08-06 ENCOUNTER — APPOINTMENT (OUTPATIENT)
Dept: PHYSICAL THERAPY | Age: 45
End: 2020-08-06
Attending: PHYSICAL MEDICINE & REHABILITATION
Payer: COMMERCIAL

## 2020-08-06 NOTE — TELEPHONE ENCOUNTER
Pt did not show or call. I called and left a message. Informed pt that she had 1 more visit scheduled on 8/20. Requested for a return call if she plans to return to PT and attend this visit. Provided my direct line at [phone].

## 2020-08-06 NOTE — TELEPHONE ENCOUNTER
Lizette Frankel am not sure if there is a PSR following up on calling the pt's insurance as I don't see another telephone encounter or staff message. I will include Darrion to see if she has any information.    Thank you,Letty

## 2020-08-17 ENCOUNTER — TELEPHONE (OUTPATIENT)
Dept: PHYSICAL THERAPY | Age: 45
End: 2020-08-17

## 2020-08-17 NOTE — TELEPHONE ENCOUNTER
Raul Mcdonald reviewed pt's insurance authorization and she states that she has 15 visits approved. Pt reports that insurance approved for only for 13 visits. I have written progress note to request authorization for PT visits beyond 13 visits.  Darrion call the

## 2020-08-18 ENCOUNTER — TELEPHONE (OUTPATIENT)
Dept: PHYSICAL THERAPY | Age: 45
End: 2020-08-18

## 2020-08-18 ENCOUNTER — TELEPHONE (OUTPATIENT)
Dept: NEUROLOGY | Facility: CLINIC | Age: 45
End: 2020-08-18

## 2020-08-18 NOTE — TELEPHONE ENCOUNTER
Spoke to Harold Mcardle at rehab. She will look into this to see if new order is needed and call the office back.

## 2020-08-20 ENCOUNTER — APPOINTMENT (OUTPATIENT)
Dept: PHYSICAL THERAPY | Age: 45
End: 2020-08-20
Attending: PHYSICAL MEDICINE & REHABILITATION
Payer: COMMERCIAL

## 2020-09-09 ENCOUNTER — TELEPHONE (OUTPATIENT)
Dept: PHYSICAL THERAPY | Age: 45
End: 2020-09-09

## 2020-09-09 NOTE — TELEPHONE ENCOUNTER
----- Message from Cristóbal Jewell sent at 9/9/2020  9:11 AM CDT -----  Regarding: insurance  [employer],  I spoke with Billy at [employer] for pt. He said total of 15 visits was authorized total for therapy.  So we did everything on our end correct

## 2021-01-21 ENCOUNTER — OFFICE VISIT (OUTPATIENT)
Dept: NEUROLOGY | Facility: CLINIC | Age: 46
End: 2021-01-21
Payer: COMMERCIAL

## 2021-01-21 VITALS — BODY MASS INDEX: 24.33 KG/M2 | WEIGHT: 155 LBS | HEIGHT: 67 IN

## 2021-01-21 DIAGNOSIS — M54.16 LUMBAR RADICULOPATHY: ICD-10-CM

## 2021-01-21 DIAGNOSIS — M51.9 LUMBAR DISC DISEASE: ICD-10-CM

## 2021-01-21 DIAGNOSIS — M54.50 ACUTE RIGHT-SIDED LOW BACK PAIN WITHOUT SCIATICA: ICD-10-CM

## 2021-01-21 DIAGNOSIS — M47.816 LUMBAR FACET ARTHROPATHY: ICD-10-CM

## 2021-01-21 DIAGNOSIS — M51.26 LUMBAR HERNIATED DISC: Primary | ICD-10-CM

## 2021-01-21 DIAGNOSIS — M47.816 LUMBAR FACET JOINT SYNDROME: ICD-10-CM

## 2021-01-21 PROCEDURE — 3008F BODY MASS INDEX DOCD: CPT | Performed by: PHYSICAL MEDICINE & REHABILITATION

## 2021-01-21 PROCEDURE — 99214 OFFICE O/P EST MOD 30 MIN: CPT | Performed by: PHYSICAL MEDICINE & REHABILITATION

## 2021-01-21 RX ORDER — ERGOCALCIFEROL 1.25 MG/1
CAPSULE ORAL
COMMUNITY

## 2021-01-21 NOTE — PATIENT INSTRUCTIONS
Plan  She will get into PT for the lumbar spine focus ing on the right L5-S z-joint. She will continue to monitor her neck symptoms and will do the stretching if she has any neck issues.     If the PT does not help the right low back pain, then she cleve

## 2021-01-21 NOTE — PROGRESS NOTES
Low Back Pain H & P    Chief Complaint: Patient presents with:  Low Back Pain: LOV: 06/23/2020. Patient here for lower back pain. Patient completed PT and still does home exercises. Patient states she rarely has pain but when she does has new joint pain.  Reza Dickson Grandmother    • Hypertension Father    • Hypertension Mother    • Diabetes Mother    • Diabetes Maternal Grandmother        Social History   Social History    Socioeconomic History      Marital status:       Spouse name: Not on file      Number of Exercise: No        Bike Helmet: Not Asked        Seat Belt: Not Asked        Self-Exams: Not Asked    Social History Narrative      The patient uses the following assistive device(s):  quad cane. The patient does live in a home with stairs.       Re Dorsalis pedis pulse-LEFT 2+   Tibialis posterior pulse-RIGHT 2+   Tibialis posterior pulse-LEFT 2+     Neurological Lower Extremity:    Light Touch Sensation: Intact in bilateral LE except:  Decreased in the  lateral  medial Left thigh  lateral  medial

## 2021-03-15 ENCOUNTER — TELEPHONE (OUTPATIENT)
Dept: PHYSICAL THERAPY | Facility: HOSPITAL | Age: 46
End: 2021-03-15

## 2021-03-15 NOTE — TELEPHONE ENCOUNTER
Srikanth Vega is a previous pt of mine and I used to see her early in the mornings.  Please offer her these held times if they work for her schedule:    3/26 = 7:00am  3/30 = 7:45 am  4/9 = 7:00 am  4/13 = 7:00 am  4/15 = 7:00 am   * Then my mornings are open mid-A

## 2021-03-23 ENCOUNTER — OFFICE VISIT (OUTPATIENT)
Dept: PHYSICAL THERAPY | Age: 46
End: 2021-03-23
Attending: PHYSICAL MEDICINE & REHABILITATION
Payer: COMMERCIAL

## 2021-03-23 PROCEDURE — 97162 PT EVAL MOD COMPLEX 30 MIN: CPT

## 2021-03-23 PROCEDURE — 97110 THERAPEUTIC EXERCISES: CPT

## 2021-03-23 PROCEDURE — 97530 THERAPEUTIC ACTIVITIES: CPT

## 2021-03-23 NOTE — PATIENT INSTRUCTIONS
Home Exercise Program    GLUTEAL SETS -  Repeat 10 Times, Hold 5 Seconds, Complete 2 Sets, Perform 2 Times a Day    SUPINE HIP ABDUCTION - ELASTIC BAND CLAMS - [place] -  Repeat 10 Times, Hold 1 Second(s), Complete 2 Sets, Perform 2 Times a Day    Hip Ad

## 2021-03-23 NOTE — PROGRESS NOTES
SPINE EVALUATION:   Referring Physician: Dr. Cano Nurse  Diagnosis:    Lumbar herniated disc (M51.26)  Lumbar disc disease (M51.9)  Lumbar radiculopathy (M54.16)  Acute right-sided low back pain without sciatica (M54.5)  Lumbar facet arthropathy (M47.816)  Lum HAs. She is taking antibiotics for inflamed lymph nodes in neck and L breast.  Pt denies diplopia, dysarthria, dysphasia, dizziness, drop attacks, bowel/bladder changes, saddle anesthesia, and DAVIS LE N/T.     Are you being hurt, frightened, demeaned, or luma R 4/5; L 4+/5  Hip Extension: R 3+/5*; L 4/5   Hip ER: R 4/5; L 5/5  Hip IR: R 5/5; L 5/5  Knee Flexion: R 5/5; L 5/5   Knee extension (L3): R 5/5; L 5/5   DF (L4): R 5/5; L 5/5  Great Toe Ext (L5): R 5/5, L 5/5  PF (S1): R 5/5; L 5/5    Abdominal Strength have good lumbopelvic control to lift and carry a load of laundry without difficulty. Frequency / Duration: Patient will be seen for 1-2 x/week or a total of 12 visits over a 90 day period.  Treatment will include: Gait training, Manual Therapy, Neuromus

## 2021-03-24 ENCOUNTER — TELEPHONE (OUTPATIENT)
Dept: PHYSICAL THERAPY | Facility: HOSPITAL | Age: 46
End: 2021-03-24

## 2021-03-26 ENCOUNTER — OFFICE VISIT (OUTPATIENT)
Dept: PHYSICAL THERAPY | Age: 46
End: 2021-03-26
Attending: PHYSICAL MEDICINE & REHABILITATION
Payer: COMMERCIAL

## 2021-03-26 PROCEDURE — 97110 THERAPEUTIC EXERCISES: CPT

## 2021-03-26 NOTE — PROGRESS NOTES
Dx:     Lumbar herniated disc (M51.26)  Lumbar disc disease (M51.9)  Lumbar radiculopathy (M54.16)  Acute right-sided low back pain without sciatica (M54.5)  Lumbar facet arthropathy (M47.816)  Lumbar facet joint syndrome (M47.816)     Insurance (Authorize clocks supine & sitting; 20x each dir/position  Bridging 20x       Therapeutic Activity  Supine to sidelying; sidelying to sit transfer                     HEP: prone glut sets; hip add at ball; hip tband; pelvic clocks x 3 planes in sitting    Charges: TE

## 2021-03-30 ENCOUNTER — OFFICE VISIT (OUTPATIENT)
Dept: PHYSICAL THERAPY | Age: 46
End: 2021-03-30
Attending: PHYSICAL MEDICINE & REHABILITATION
Payer: COMMERCIAL

## 2021-03-30 PROCEDURE — 97110 THERAPEUTIC EXERCISES: CPT

## 2021-03-30 NOTE — PROGRESS NOTES
Dx:     Lumbar herniated disc (M51.26)  Lumbar disc disease (M51.9)  Lumbar radiculopathy (M54.16)  Acute right-sided low back pain without sciatica (M54.5)  Lumbar facet arthropathy (M47.816)  Lumbar facet joint syndrome (M47.816)     Insurance (Authorize distances such as shopping without increased pain or c/o fatigue. 5. Patient will have good lumbopelvic control to lift and carry a load of laundry without difficulty. 3/30/2021: reviewed goals with pt. Goals in progress.     Plan: Focus on lumbopelvic st

## 2021-04-08 ENCOUNTER — TELEPHONE (OUTPATIENT)
Dept: PHYSICAL THERAPY | Age: 46
End: 2021-04-08

## 2021-04-09 ENCOUNTER — APPOINTMENT (OUTPATIENT)
Dept: PHYSICAL THERAPY | Age: 46
End: 2021-04-09
Attending: PHYSICAL MEDICINE & REHABILITATION
Payer: COMMERCIAL

## 2021-04-14 ENCOUNTER — APPOINTMENT (OUTPATIENT)
Dept: PHYSICAL THERAPY | Age: 46
End: 2021-04-14
Payer: COMMERCIAL

## 2021-04-20 ENCOUNTER — OFFICE VISIT (OUTPATIENT)
Dept: PHYSICAL THERAPY | Age: 46
End: 2021-04-20
Attending: INTERNAL MEDICINE
Payer: COMMERCIAL

## 2021-04-20 PROCEDURE — 97110 THERAPEUTIC EXERCISES: CPT

## 2021-04-20 PROCEDURE — 97140 MANUAL THERAPY 1/> REGIONS: CPT

## 2021-04-20 NOTE — PROGRESS NOTES
Dx:     Lumbar herniated disc (M51.26)  Lumbar disc disease (M51.9)  Lumbar radiculopathy (M54.16)  Acute right-sided low back pain without sciatica (M54.5)  Lumbar facet arthropathy (M47.816)  Lumbar facet joint syndrome (M47.816)     Insurance (Authorize 5/5; L 5/5  Great Toe Ext (L5): R 5/5, L 5/5  PF (S1): R 5/5; L 5/5     Abdominal Strength: 3+/5 Hip flexion (L2): R 5/5; L 5/5  Hip abduction: R 4/5; L 4+/5  Hip Extension: R 4/5; L 4/5            Hip ER: R 4/5*; L 5/5  Hip IR: R 5/5; L 5/5  Knee Flexion: and carry a load of laundry without difficulty. (IN PROGRESS)  4/20/2021: reviewed goals with pt. Plan: Pt to see Dr. Viann Meckel on 4/29/2021. Await further MD recommendations.  Continue skilled Physical Therapy 1-2 x/week or a total of 8 visits over a 90 (sitbacks): 10x2     Therapeutic Activity  Supine to sidelying; sidelying to sit transfer        Neuro Re-ed  Gait training; emphasis on neutral spine and hips; heel to toe        Manual Therapy  Prone: P/A lumbar mob grades II-IIV  Prone: MFR at lumbar pa

## 2021-04-29 ENCOUNTER — HOSPITAL ENCOUNTER (OUTPATIENT)
Dept: GENERAL RADIOLOGY | Facility: HOSPITAL | Age: 46
Discharge: HOME | End: 2021-04-29
Attending: PHYSICAL MEDICINE & REHABILITATION
Payer: COMMERCIAL

## 2021-04-29 ENCOUNTER — OFFICE VISIT (OUTPATIENT)
Dept: NEUROLOGY | Facility: CLINIC | Age: 46
End: 2021-04-29
Payer: COMMERCIAL

## 2021-04-29 VITALS — HEIGHT: 66 IN | BODY MASS INDEX: 24.59 KG/M2 | WEIGHT: 153 LBS

## 2021-04-29 DIAGNOSIS — M54.12 CERVICAL RADICULOPATHY: ICD-10-CM

## 2021-04-29 DIAGNOSIS — M51.26 LUMBAR HERNIATED DISC: ICD-10-CM

## 2021-04-29 DIAGNOSIS — M54.50 ACUTE RIGHT-SIDED LOW BACK PAIN WITHOUT SCIATICA: ICD-10-CM

## 2021-04-29 DIAGNOSIS — M25.50 GENERALIZED JOINT PAIN: ICD-10-CM

## 2021-04-29 DIAGNOSIS — M54.2 NECK PAIN ON RIGHT SIDE: Primary | ICD-10-CM

## 2021-04-29 DIAGNOSIS — M54.2 NECK PAIN ON RIGHT SIDE: ICD-10-CM

## 2021-04-29 DIAGNOSIS — M51.9 LUMBAR DISC DISEASE: ICD-10-CM

## 2021-04-29 DIAGNOSIS — M47.816 LUMBAR FACET ARTHROPATHY: ICD-10-CM

## 2021-04-29 DIAGNOSIS — M47.816 LUMBAR FACET JOINT SYNDROME: ICD-10-CM

## 2021-04-29 PROCEDURE — 3008F BODY MASS INDEX DOCD: CPT | Performed by: PHYSICAL MEDICINE & REHABILITATION

## 2021-04-29 PROCEDURE — 72050 X-RAY EXAM NECK SPINE 4/5VWS: CPT | Performed by: PHYSICAL MEDICINE & REHABILITATION

## 2021-04-29 PROCEDURE — 99214 OFFICE O/P EST MOD 30 MIN: CPT | Performed by: PHYSICAL MEDICINE & REHABILITATION

## 2021-04-29 RX ORDER — PROPRANOLOL HYDROCHLORIDE 20 MG/1
TABLET ORAL
COMMUNITY
Start: 2021-04-26

## 2021-04-29 NOTE — PROGRESS NOTES
Low Back Pain H & P    Chief Complaint: Patient presents with:  Back Pain: LOV: 01/21/2021. Patient states she did PT and trying to do home exercises. No injecitons done after jan. Patient rates pain 0/10. Patient denies N/T.  Patient would like to discuss status:       Spouse name: Not on file      Number of children: Not on file      Years of education: Not on file      Highest education level: Not on file    Occupational History      Occupation: [employer]    Tobacco Use      Smoki Fear of Current or Ex-Partner:       Emotionally Abused:       Physically Abused:       Sexually Abused:     Review of Systems  Patient-reported ROS  Constitutional  Sleep Disturbance: admits   Cardiovascular  Chest Pain: denies  Irregular Heartbeat: lucille Extremity:    Light Touch: Intact in Bilateral upper extremities. Pin Prick: Not tested. UE Muscle Strength:  All Upper Extremity strength measurements 5/5 except:  Triceps Right: 4/5  Serratus anterior Right: 4/5   Reflexes: 2+ In the bilateral upper e the right. She will continue to use the SIJ belt which has been helping her. The patient does not need any injections at this time. She will get blood work for the joint pain. She will follow up in 3 months or sooner if needed.     The patient un

## 2021-04-29 NOTE — PATIENT INSTRUCTIONS
Plan  She will get cervical spine x-rays. She will do the PT on the cervical spine. She will continue with the PT for the lumbar spine and the SIJ on the right. She will continue to use the SIJ belt which has been helping her.     The patient does

## 2021-04-30 ENCOUNTER — OFFICE VISIT (OUTPATIENT)
Dept: PHYSICAL THERAPY | Age: 46
End: 2021-04-30
Attending: PHYSICAL MEDICINE & REHABILITATION
Payer: COMMERCIAL

## 2021-04-30 PROCEDURE — 97110 THERAPEUTIC EXERCISES: CPT

## 2021-04-30 PROCEDURE — 97140 MANUAL THERAPY 1/> REGIONS: CPT

## 2021-04-30 NOTE — PROGRESS NOTES
Dx:     Lumbar herniated disc (M51.26)  Lumbar disc disease (M51.9)  Lumbar radiculopathy (M54.16)  Acute right-sided low back pain without sciatica (M54.5)  Lumbar facet arthropathy (M47.816)  Lumbar facet joint syndrome (M47.816)   Neck pain on right negro 5/5  Triceps (C7): R 4/5, L 5/5  Wrist Flex (C7): R 5/5, L 5/5  Digit Flex (C8): R 4/5, L 5/5  Thumb Ext (C8): R 4/5, L 5/5  Interossei (T1): R 4 -/5, L 5/5    Rhomboids: R 4/5, L 4+/5  Mid trap: R 3+/5; L 4 -/5  Lats: R 4/5, L 4/5  Low trap: R 3 -/5; L 3+ acute cervical symptoms as prescribed by Dr. Sima Vanegas. Cervical symptoms centralized and reduced with retractions in sitting and she demonstrated reduced cervical pain with neck rotation post manual therapy. Goals: (to be met in 12 visits)   1.  Patient columba 10x5\"   Therapeutic Exercise  Pelvic tilts with hip add ball 20x1  Pelvic tilts with hip blue tband 20x1  Bridging with hip add band 20x  Sidelying clams 20x   Sidelying: hip SLR 10x2  Squats (sitbacks): 10x2 Therapeutic Exercise  Objective/subjective natalio

## 2021-05-04 ENCOUNTER — OFFICE VISIT (OUTPATIENT)
Dept: PHYSICAL THERAPY | Age: 46
End: 2021-05-04
Attending: PHYSICAL MEDICINE & REHABILITATION
Payer: COMMERCIAL

## 2021-05-04 PROCEDURE — 97110 THERAPEUTIC EXERCISES: CPT

## 2021-05-04 PROCEDURE — 97140 MANUAL THERAPY 1/> REGIONS: CPT

## 2021-05-04 NOTE — PROGRESS NOTES
Dx:     Lumbar herniated disc (M51.26)  Lumbar disc disease (M51.9)  Lumbar radiculopathy (M54.16)  Acute right-sided low back pain without sciatica (M54.5)  Lumbar facet arthropathy (M47.816)  Lumbar facet joint syndrome (M47.816)   Neck pain on right negro loss; L no loss  Rotation: R no loss; L no loss    trunk AROM: 4/20/2021 (* denotes performed with pain)  Flexion: Min loss (no pain)  Extension: min loss  Sidebending: R no loss; L no loss  Rotation: R no loss; L no loss    Strength: (* denotes performed without increased pain or c/o fatigue. (IN PROGRESS)  5. Patient will have good lumbopelvic control to lift and carry a load of laundry without difficulty. (IN PROGRESS)  6.  NEW GOAL (4/30/2021): pt will have cervical L SBing ROM at least 30 deg to facilit and hips; heel to toe        Manual Therapy  Prone: P/A lumbar mob grades II-IIV  Prone: MFR at lumbar paraspinals  Gentle MFR at B psoas/diaphragm Manual Therapy:   Supine: cervical distraction   Supine: MFR at cervical paraspinals/UT/scalenes  Mid-cervic

## 2021-05-11 ENCOUNTER — OFFICE VISIT (OUTPATIENT)
Dept: PHYSICAL THERAPY | Age: 46
End: 2021-05-11
Attending: PHYSICAL MEDICINE & REHABILITATION
Payer: COMMERCIAL

## 2021-05-11 PROCEDURE — 97140 MANUAL THERAPY 1/> REGIONS: CPT

## 2021-05-11 PROCEDURE — 97110 THERAPEUTIC EXERCISES: CPT

## 2021-05-11 NOTE — PROGRESS NOTES
Dx:     Lumbar herniated disc (M51.26)  Lumbar disc disease (M51.9)  Lumbar radiculopathy (M54.16)  Acute right-sided low back pain without sciatica (M54.5)  Lumbar facet arthropathy (M47.816)  Lumbar facet joint syndrome (M47.816)   Neck pain on right negro pain)  UE 4/30/2021 5/11/2021   Shoulder Flex: R 4 -/5*, L 4/5  Shoulder ABD (C5): R 4 -/5*, L 4+/5  Shoulder IR: R 5/5, L 5/5  Shoulder ER: R 5/5, L 5/5  Biceps (C6): R 4/5, L 5/5  Wrist ext (C6): R 5/5, L 5/5  Triceps (C7): R 4/5, L 5/5  Wrist Flex (C7): DF (L4): R 5/5; L 5/5  Great Toe Ext (L5): R 5/5, L 5/5  PF (S1): R 5/5; L 5/5     Abdominal Strength: 3+/5 Hip flexion (L2): R 5/5; L 5/5  Hip abduction: R 4/5; L 4+/5  Hip Extension: R 4/5; L 4/5            Hip ER: R 4/5*; L 5/5  Hip IR: R 5/5; L 5 flexion ROM WFL to ease her ability to bend forward to don/doff her shoes. (MET)  3. Patient will perform all bed mobility transfers with good body mechanics and without provocation of pain. (PARTIALLY MET)  4.  Patient will have at least 4+/5 MMT grade hip Date: 4/20/2021               TX#: 4/7 Date:  4/30/2021               TX#: 5/7 Date: 5/4/2021  Tx#: 6/7 Date: 5/11/2021  Tx#: 7/7   Therapeutic Exercise  Pelvic tilts with hip add ball 20x1  Pelvic tilts with hip blue tband 20x1  Bridging with hip

## 2021-05-18 ENCOUNTER — TELEPHONE (OUTPATIENT)
Dept: PHYSICAL THERAPY | Age: 46
End: 2021-05-18

## 2021-05-18 ENCOUNTER — OFFICE VISIT (OUTPATIENT)
Dept: PHYSICAL THERAPY | Age: 46
End: 2021-05-18
Attending: PHYSICAL MEDICINE & REHABILITATION
Payer: COMMERCIAL

## 2021-05-18 NOTE — PROGRESS NOTES
Dx:     Lumbar herniated disc (M51.26)  Lumbar disc disease (M51.9)  Lumbar radiculopathy (M54.16)  Acute right-sided low back pain without sciatica (M54.5)  Lumbar facet arthropathy (M47.816)  Lumbar facet joint syndrome (M47.816)   Neck pain on right negro 4*/5, L 4+/5  Shoulder IR: R 5/5, L 5/5  Shoulder ER: R 5/5, L 5/5  Biceps (C6): R 4/5, L 5/5  Wrist ext (C6): R 5/5, L 5/5  Triceps (C7): R 4/5, L 5/5  Wrist Flex (C7): R 5/5, L 5/5  Digit Flex (C8): R 4/5, L 5/5  Thumb Ext (C8): R 4/5, L 5/5  Interossei (+), L = (-)    Sacroiliac Joint Special Test: 5/11/2021. JC: R = (-), L = (-)  2. Compression test: R = (-), L = (-)  3.  Distraction test: R = (-), L = (-)    Sacroiliac Joint Alignment 5/11/2021: appears level in supine    Hand/wrist accessory motion TX#: 5/7 Date: 5/4/2021  Tx#: 6/7 Date: 5/11/2021  Tx#: 7/7   Therapeutic Exercise  Pelvic tilts with hip add ball 20x1  Pelvic tilts with hip blue tband 20x1  Bridging with hip add band 20x  Sidelying clams 20x   Sidelying: hip SLR 10x2  Squats (sitback

## 2021-05-18 NOTE — TELEPHONE ENCOUNTER
Spoke with JAYME for peer to peer review for insurance authorization beyond 7 visits. Discussed initiation of PT starting with lumbar spine and then addition of cervical spine per MD orders. Provided clinical findings for both areas being treated.     Pt

## 2021-05-25 ENCOUNTER — TELEPHONE (OUTPATIENT)
Dept: PHYSICAL THERAPY | Facility: HOSPITAL | Age: 46
End: 2021-05-25

## 2021-05-25 ENCOUNTER — APPOINTMENT (OUTPATIENT)
Dept: PHYSICAL THERAPY | Age: 46
End: 2021-05-25
Payer: COMMERCIAL

## 2021-05-28 ENCOUNTER — OFFICE VISIT (OUTPATIENT)
Dept: PHYSICAL THERAPY | Age: 46
End: 2021-05-28
Attending: PHYSICAL MEDICINE & REHABILITATION
Payer: COMMERCIAL

## 2021-05-28 PROCEDURE — 97140 MANUAL THERAPY 1/> REGIONS: CPT

## 2021-05-28 PROCEDURE — 97110 THERAPEUTIC EXERCISES: CPT

## 2021-05-28 NOTE — PATIENT INSTRUCTIONS
Home Exercise Program    Theraband shoulder external rotation -  Repeat 10 Times, Hold 1 Second(s), Complete 2 Sets, Perform 3 Times a Week    ELASTIC BAND ROWS  -  Repeat 10 Times, Hold 1 Second(s), Complete 2 Sets, Perform 3 Times a Week    ELASTIC BAND

## 2021-05-28 NOTE — PROGRESS NOTES
Dx:     Lumbar herniated disc (M51.26)  Lumbar disc disease (M51.9)  Lumbar radiculopathy (M54.16)  Acute right-sided low back pain without sciatica (M54.5)  Lumbar facet arthropathy (M47.816)  Lumbar facet joint syndrome (M47.816)   Neck pain on right negro 4/5  Shoulder ABD (C5): R 4*/5, L 4+/5  Shoulder IR: R 5/5, L 5/5  Shoulder ER: R 5/5, L 5/5  Biceps (C6): R 4/5, L 5/5  Wrist ext (C6): R 5/5, L 5/5  Triceps (C7): R 4/5, L 5/5  Wrist Flex (C7): R 5/5, L 5/5  Digit Flex (C8): R 4/5, L 5/5  Thumb Ext (C8): (-)  3. Distraction test: R = (+), L = (-)    Sacroiliac Joint Special Test: 5/11/2021. JC: R = (-), L = (-)  2. Compression test: R = (-), L = (-)  3.  Distraction test: R = (-), L = (-)    Sacroiliac Joint Alignment 5/11/2021: appears level in supine Exercise  Supine: chin tucks 10x1  Supine: shoulder flexion cane: 10x2  Supine: bridging articulation: 10x2  Cervical retraction in sitting: 10x2  Scapular retraction 10x2  Shoulder rolls bwd: 10x2  Cervical retraction in sitting with pt OP: 10x2 Therapeut

## 2021-06-03 ENCOUNTER — OFFICE VISIT (OUTPATIENT)
Dept: PHYSICAL THERAPY | Age: 46
End: 2021-06-03
Attending: PHYSICAL MEDICINE & REHABILITATION
Payer: COMMERCIAL

## 2021-06-03 PROCEDURE — 97140 MANUAL THERAPY 1/> REGIONS: CPT

## 2021-06-03 PROCEDURE — 97110 THERAPEUTIC EXERCISES: CPT

## 2021-06-03 NOTE — PROGRESS NOTES
Dx:     Lumbar herniated disc (M51.26)  Lumbar disc disease (M51.9)  Lumbar radiculopathy (M54.16)  Acute right-sided low back pain without sciatica (M54.5)  Lumbar facet arthropathy (M47.816)  Lumbar facet joint syndrome (M47.816)   Neck pain on right negro IR: R 5/5, L 5/5  Shoulder ER: R 5/5, L 5/5  Biceps (C6): R 4/5, L 5/5  Wrist ext (C6): R 5/5, L 5/5  Triceps (C7): R 4/5, L 5/5  Wrist Flex (C7): R 5/5, L 5/5  Digit Flex (C8): R 4/5, L 5/5  Thumb Ext (C8): R 4/5, L 5/5  Interossei (T1): R 4 -/5, L 5/5 (-)  3. Distraction test: R = (+), L = (-)    Sacroiliac Joint Special Test: 5/11/2021. JC: R = (-), L = (-)  2. Compression test: R = (-), L = (-)  3.  Distraction test: R = (-), L = (-)    Sacroiliac Joint Alignment 5/11/2021: appears level in supine to 90 deg 10x2  Sitting: cervical retraction with pt OP: 10x2 Therapeutic Exercise  Supine Hip add ball 20x  Supine Hip abd tband 20x  Sidelying hip Clamshells 20x  Standing scap row red tband 20x  Shoulder ext red tband 20x  Shoulder external rot red tban

## 2021-06-04 ENCOUNTER — OFFICE VISIT (OUTPATIENT)
Dept: PHYSICAL THERAPY | Age: 46
End: 2021-06-04
Attending: PHYSICAL MEDICINE & REHABILITATION
Payer: COMMERCIAL

## 2021-06-04 PROBLEM — M50.90 CERVICAL DISC DISEASE: Status: ACTIVE | Noted: 2021-06-04

## 2021-06-04 PROCEDURE — 97110 THERAPEUTIC EXERCISES: CPT

## 2021-06-04 PROCEDURE — 97140 MANUAL THERAPY 1/> REGIONS: CPT

## 2021-06-04 NOTE — PROGRESS NOTES
Dx:     Lumbar herniated disc (M51.26)  Lumbar disc disease (M51.9)  Lumbar radiculopathy (M54.16)  Acute right-sided low back pain without sciatica (M54.5)  Lumbar facet arthropathy (M47.816)  Lumbar facet joint syndrome (M47.816)   Neck pain on right negro 5/5  Biceps (C6): R 4/5, L 5/5  Wrist ext (C6): R 5/5, L 5/5  Triceps (C7): R 4/5, L 5/5  Wrist Flex (C7): R 5/5, L 5/5  Digit Flex (C8): R 4/5, L 5/5  Thumb Ext (C8): R 4/5, L 5/5  Interossei (T1): R 4 -/5, L 5/5    Rhomboids: R 4/5, L 4+/5  Mid trap: R 3 (-)    Sacroiliac Joint Special Test: 5/11/2021. JC: R = (-), L = (-)  2. Compression test: R = (-), L = (-)  3.  Distraction test: R = (-), L = (-)    Sacroiliac Joint Alignment 5/11/2021: appears level in supine    Hand/wrist accessory motion (remarkab add ball 20x  Supine Hip abd tband 20x  Sidelying hip Clamshells 20x  Standing scap row red tband 20x  Shoulder ext red tband 20x  Shoulder external rot red tband 20x Therapeutic Exercise  Prone R piriformis manual stretch  Prone glut set 20x5\"  Prone hip

## 2021-06-08 ENCOUNTER — OFFICE VISIT (OUTPATIENT)
Dept: PHYSICAL THERAPY | Age: 46
End: 2021-06-08
Attending: PHYSICAL MEDICINE & REHABILITATION
Payer: COMMERCIAL

## 2021-06-08 PROCEDURE — 97110 THERAPEUTIC EXERCISES: CPT

## 2021-06-08 PROCEDURE — 97140 MANUAL THERAPY 1/> REGIONS: CPT

## 2021-06-08 NOTE — PROGRESS NOTES
Dx:     Lumbar herniated disc (M51.26)  Lumbar disc disease (M51.9)  Lumbar radiculopathy (M54.16)  Acute right-sided low back pain without sciatica (M54.5)  Lumbar facet arthropathy (M47.816)  Lumbar facet joint syndrome (M47.816)   Neck pain on right negro (C8): R 4/5, L 5/5  Interossei (T1): R 4 -/5, L 5/5    Rhomboids: R 4/5, L 4+/5  Mid trap: R 3+/5; L 4 -/5  Lats: R 4/5, L 4/5  Low trap: R 3 -/5; L 3+/5  Anterior Serratus: R = 3 -/5, L 3+/5     Cervical instability test: 4/30/2021  Transverse ligament: ( Joint Alignment 5/11/2021: appears level in supine    Hand/wrist accessory motion (remarkable findings) 5/11/2021: decreased at R PIP joints at 2nd and 4th fingers with pain    FOTO initial: 57.7%  FOTO 5/11/2021: 35.2%      Assessment: pt without c/o pain 20x  Standing scap row red tband 20x  Shoulder ext red tband 20x  Shoulder external rot red tband 20x Therapeutic Exercise  Prone R piriformis manual stretch  Prone glut set 20x5\"  Prone hip SLR 10x2  Sitting: piriformis stretch 20\"x5  HS stretch with fo

## 2021-06-11 ENCOUNTER — OFFICE VISIT (OUTPATIENT)
Dept: PHYSICAL THERAPY | Age: 46
End: 2021-06-11
Attending: PHYSICAL MEDICINE & REHABILITATION
Payer: COMMERCIAL

## 2021-06-11 PROCEDURE — 97110 THERAPEUTIC EXERCISES: CPT

## 2021-06-11 PROCEDURE — 97140 MANUAL THERAPY 1/> REGIONS: CPT

## 2021-06-11 NOTE — PROGRESS NOTES
Dx:     Lumbar herniated disc (M51.26)  Lumbar disc disease (M51.9)  Lumbar radiculopathy (M54.16)  Acute right-sided low back pain without sciatica (M54.5)  Lumbar facet arthropathy (M47.816)  Lumbar facet joint syndrome (M47.816)   Neck pain on right negro 5/5  Shoulder IR: R 5/5, L 5/5  Shoulder ER: R 5/5, L 5/5  Biceps (C6): R 5/5, L 5/5  Wrist ext (C6): R 5/5, L 5/5  Triceps (C7): R 5/5, L 5/5  Wrist Flex (C7): R 5/5, L 5/5  Digit Flex (C8): R 5/5, L 5/5  Thumb Ext (C8): R 5/5, L 5/5  Interossei (T1): R 5 Toe Ext (L5): R 5/5, L 5/5  PF (S1): R 5/5; L 5/5    Abdominal Strength: 4/5      Sacroiliac Joint Special Test: 4/20/2021  1. JC: R = (-), L = (-)  2. Compression test: R = (-), L = (-)  3.  Distraction test: R = (+), L = (-)    Sacroiliac Joint Special least 4+/5 MMT grade hip strength to ambulate community distances such as shopping without increased pain or c/o fatigue. (MET)  5. Patient will have good lumbopelvic control to lift and carry a load of laundry without difficulty.  (Partially Met - reports manual stretch  Prone glut set 20x5\"  Prone hip SLR 10x2  Prone hip IR/ER AROM 10x2  Bridging 20x  Supine: LTR 10x  Sitting: piriformis stretch 20\"x5  HS stretch with foot at chair 20\"x5 Therapeutic Exercise  Prone R piriformis manual stretch  Prone glu

## 2021-06-15 ENCOUNTER — APPOINTMENT (OUTPATIENT)
Dept: PHYSICAL THERAPY | Age: 46
End: 2021-06-15
Payer: COMMERCIAL

## 2021-08-05 ENCOUNTER — OFFICE VISIT (OUTPATIENT)
Dept: NEUROLOGY | Facility: CLINIC | Age: 46
End: 2021-08-05
Payer: COMMERCIAL

## 2021-08-05 VITALS
BODY MASS INDEX: 24.59 KG/M2 | OXYGEN SATURATION: 100 % | HEIGHT: 66 IN | SYSTOLIC BLOOD PRESSURE: 102 MMHG | DIASTOLIC BLOOD PRESSURE: 78 MMHG | WEIGHT: 153 LBS | HEART RATE: 56 BPM

## 2021-08-05 DIAGNOSIS — M54.16 LUMBAR RADICULOPATHY: Primary | ICD-10-CM

## 2021-08-05 DIAGNOSIS — M51.26 LUMBAR HERNIATED DISC: ICD-10-CM

## 2021-08-05 DIAGNOSIS — M51.9 LUMBAR DISC DISEASE: ICD-10-CM

## 2021-08-05 DIAGNOSIS — M54.12 CERVICAL RADICULOPATHY: ICD-10-CM

## 2021-08-05 DIAGNOSIS — M50.90 CERVICAL DISC DISEASE: ICD-10-CM

## 2021-08-05 PROCEDURE — 3078F DIAST BP <80 MM HG: CPT | Performed by: PHYSICAL MEDICINE & REHABILITATION

## 2021-08-05 PROCEDURE — 3074F SYST BP LT 130 MM HG: CPT | Performed by: PHYSICAL MEDICINE & REHABILITATION

## 2021-08-05 PROCEDURE — 3008F BODY MASS INDEX DOCD: CPT | Performed by: PHYSICAL MEDICINE & REHABILITATION

## 2021-08-05 PROCEDURE — 99214 OFFICE O/P EST MOD 30 MIN: CPT | Performed by: PHYSICAL MEDICINE & REHABILITATION

## 2021-08-05 RX ORDER — HYDROCODONE BITARTRATE AND ACETAMINOPHEN 7.5; 325 MG/1; MG/1
1 TABLET ORAL
COMMUNITY

## 2021-08-05 RX ORDER — SUMATRIPTAN 100 MG/1
TABLET, FILM COATED ORAL
COMMUNITY
Start: 2021-07-13

## 2021-08-05 NOTE — PATIENT INSTRUCTIONS
Plan  The patient will continue with her home exercise program.    She will continue the bike and the elliptical.    She will speak with her neurologist about the propanolol and the foot swelling. She will keep a log of her symptoms.     The patient do

## 2021-08-05 NOTE — PROGRESS NOTES
Low Back Pain H & P    Chief Complaint: Patient presents with:  Low Back Pain: LOV 04/29/21 Patient is here to f/u for LBP. She finished PT for low back and noticed a 75% improvement.  She has been exercising and thinks that she overdid it and her pain came History   History reviewed. No pertinent past medical history.     Past Surgical History   Past Surgical History:   Procedure Laterality Date   • D & C  2000   • D & C  2007       Family History   Family History   Problem Relation Age of Onset   • Heart Dis Worried About [facility] in the Last Year:       Ran Out of Food in the Last Year:   Transportation Needs:       Lack of Transportation (Medical):       Lack of Transportation (Non-Medical):   Physical Activity:       Days of Exercise per Week: Shoulders: Level   Head: In neutral   Spinous Processes Palpations: Non-tender for all Spinous Processes   Z-Joints Palpations: Non-tender for all Z-joints   Muscular Palpations: Non-tender to palpation.      Vascular upper extremity:   Right radial pulse will speak with her neurologist about the propanolol and the foot swelling. She will keep a log of her symptoms. The patient does not need any pain medications at this time. She will take the Norco as needed only.     She will follow up in 6 months o

## 2021-08-13 ENCOUNTER — MED REC SCAN ONLY (OUTPATIENT)
Dept: NEUROLOGY | Facility: CLINIC | Age: 46
End: 2021-08-13

## 2022-02-10 ENCOUNTER — OFFICE VISIT (OUTPATIENT)
Dept: PHYSICAL MEDICINE AND REHAB | Facility: CLINIC | Age: 47
End: 2022-02-10
Payer: COMMERCIAL

## 2022-02-10 DIAGNOSIS — M51.9 LUMBAR DISC DISEASE: ICD-10-CM

## 2022-02-10 DIAGNOSIS — M54.16 LUMBAR RADICULOPATHY: Primary | ICD-10-CM

## 2022-02-10 DIAGNOSIS — M47.816 LUMBAR FACET ARTHROPATHY: ICD-10-CM

## 2022-02-10 DIAGNOSIS — M50.90 CERVICAL DISC DISEASE: ICD-10-CM

## 2022-02-10 DIAGNOSIS — M54.12 CERVICAL RADICULOPATHY: ICD-10-CM

## 2022-02-10 DIAGNOSIS — M47.816 LUMBAR FACET JOINT SYNDROME: ICD-10-CM

## 2022-02-10 DIAGNOSIS — M51.26 LUMBAR HERNIATED DISC: ICD-10-CM

## 2022-02-10 DIAGNOSIS — M77.11 RIGHT LATERAL EPICONDYLITIS: ICD-10-CM

## 2022-02-10 PROCEDURE — 99214 OFFICE O/P EST MOD 30 MIN: CPT | Performed by: PHYSICAL MEDICINE & REHABILITATION

## 2022-02-10 NOTE — PATIENT INSTRUCTIONS
Plan  She will get into PT for the right elbow. She will use a counter force brace as needed for now. The patient does not need any injections at this time. She will do a few more sessions of the PT for the lumbar spine. She will follow up 3-6 months.

## 2022-10-23 ENCOUNTER — PATIENT MESSAGE (OUTPATIENT)
Dept: PHYSICAL MEDICINE AND REHAB | Facility: CLINIC | Age: 47
End: 2022-10-23

## 2022-10-27 NOTE — TELEPHONE ENCOUNTER
From: Fox Sykes  To: Terrence Bertrand MD  Sent: 10/23/2022 12:45 PM CDT  Subject: Medication clarification     Good afternoon. I'm accepted a job that requires me to provide list of my medications and since Sigmund Barks is a controlled substance they are asking for a letter stating that I am taking it as needed for condition that Dr. Debbi Bertrand is treating me for. I would greatly appreciate if you can provide with a letter stating that I do use it as needed. Please let me know ow if I can pick it up from the office or if you can send it to my house or if you can also fax it to my workplace. With any questions feel free to call me @ [phone]. Thank you in advance for your help.   Sincerely,   Fox Sykes

## 2023-01-11 ENCOUNTER — OFFICE VISIT (OUTPATIENT)
Dept: PHYSICAL MEDICINE AND REHAB | Facility: CLINIC | Age: 48
End: 2023-01-11
Payer: COMMERCIAL

## 2023-01-11 VITALS
WEIGHT: 165 LBS | OXYGEN SATURATION: 98 % | HEIGHT: 66 IN | DIASTOLIC BLOOD PRESSURE: 80 MMHG | BODY MASS INDEX: 26.52 KG/M2 | HEART RATE: 86 BPM | SYSTOLIC BLOOD PRESSURE: 100 MMHG

## 2023-01-11 DIAGNOSIS — M51.9 LUMBAR DISC DISEASE: Primary | ICD-10-CM

## 2023-01-11 DIAGNOSIS — M77.11 RIGHT LATERAL EPICONDYLITIS: ICD-10-CM

## 2023-01-11 DIAGNOSIS — M54.16 LUMBAR RADICULOPATHY: ICD-10-CM

## 2023-01-11 DIAGNOSIS — M51.26 LUMBAR HERNIATED DISC: ICD-10-CM

## 2023-01-11 PROCEDURE — 3008F BODY MASS INDEX DOCD: CPT | Performed by: PHYSICAL MEDICINE & REHABILITATION

## 2023-01-11 PROCEDURE — 3079F DIAST BP 80-89 MM HG: CPT | Performed by: PHYSICAL MEDICINE & REHABILITATION

## 2023-01-11 PROCEDURE — 3074F SYST BP LT 130 MM HG: CPT | Performed by: PHYSICAL MEDICINE & REHABILITATION

## 2023-01-11 PROCEDURE — 99214 OFFICE O/P EST MOD 30 MIN: CPT | Performed by: PHYSICAL MEDICINE & REHABILITATION

## 2023-01-11 RX ORDER — HYDROCODONE BITARTRATE AND ACETAMINOPHEN 7.5; 325 MG/1; MG/1
1 TABLET ORAL EVERY 4 HOURS PRN
Qty: 30 TABLET | Refills: 0 | Status: SHIPPED | OUTPATIENT
Start: 2023-01-11

## 2023-01-11 RX ORDER — NORTRIPTYLINE HYDROCHLORIDE 10 MG/1
40 CAPSULE ORAL NIGHTLY
COMMUNITY
Start: 2022-12-24

## 2023-01-11 NOTE — PATIENT INSTRUCTIONS
Plan  She will get into the PT with Letty. She will take Aleve 2 tablets 2 times a day for 2 weeks. I refilled her [place] 7.5/325. If the PT is not helping her after 4 weeks of doing the PT, then I will have her get a new MRI of the lumbar spine since would need to have a lumbar ELIDIA. She will follow up 2-3 months.

## 2023-07-17 ENCOUNTER — OFFICE VISIT (OUTPATIENT)
Dept: PHYSICAL MEDICINE AND REHAB | Facility: CLINIC | Age: 48
End: 2023-07-17
Payer: COMMERCIAL

## 2023-07-17 VITALS
HEIGHT: 66 IN | BODY MASS INDEX: 28.13 KG/M2 | SYSTOLIC BLOOD PRESSURE: 104 MMHG | WEIGHT: 175 LBS | DIASTOLIC BLOOD PRESSURE: 62 MMHG | HEART RATE: 90 BPM | OXYGEN SATURATION: 99 %

## 2023-07-17 DIAGNOSIS — M51.26 LUMBAR HERNIATED DISC: ICD-10-CM

## 2023-07-17 DIAGNOSIS — M51.9 LUMBAR DISC DISEASE: Primary | ICD-10-CM

## 2023-07-17 DIAGNOSIS — M54.16 LUMBAR RADICULOPATHY: ICD-10-CM

## 2023-07-17 DIAGNOSIS — M47.816 LUMBAR FACET JOINT SYNDROME: ICD-10-CM

## 2023-07-17 PROCEDURE — 3078F DIAST BP <80 MM HG: CPT | Performed by: PHYSICAL MEDICINE & REHABILITATION

## 2023-07-17 PROCEDURE — 99214 OFFICE O/P EST MOD 30 MIN: CPT | Performed by: PHYSICAL MEDICINE & REHABILITATION

## 2023-07-17 PROCEDURE — 3008F BODY MASS INDEX DOCD: CPT | Performed by: PHYSICAL MEDICINE & REHABILITATION

## 2023-07-17 PROCEDURE — 3074F SYST BP LT 130 MM HG: CPT | Performed by: PHYSICAL MEDICINE & REHABILITATION

## 2023-07-17 RX ORDER — HYDROCODONE BITARTRATE AND ACETAMINOPHEN 7.5; 325 MG/1; MG/1
1 TABLET ORAL EVERY 4 HOURS PRN
Qty: 30 TABLET | Refills: 0 | Status: SHIPPED | OUTPATIENT
Start: 2023-07-17

## 2023-07-17 NOTE — PROGRESS NOTES
Low Back Pain H & P    Chief Complaint: Patient presents with: Follow - Up: Last visit 06/11/2023. Right buttocks/sciatica pain per patient, started one month ago. Pain doesn't travel. Pain gets severe 09/10, 4/10. Pain worst with movement, pain pulsating, stabbing, numbness as well, intermittent. Pain does stretching (minimal help), aleve (helps for 10 minutes)  and norco prn(helps for one hour). Nursing note reviewed and verified. Patient was last seen on 1/11/2023. She states that she was doing ok until about one month ago. She was not able to see [facility] for the PT after her last office visit. She was keeping up with her HEP. She states that the pain gradually developed one afternoon. The pain has continued to worsen. She is still having constipation issues which will increase this pain. Description of the Pain  The pain is located in the right buttock. The pain radiates to the right posterior proximal thigh. This pain is worst that the pain has been since she has been having the pain. The pain at its best is 3/10. The pain at its worst is 9/10. The pain is currently  3/10. The pain is described as a(n) sharp and stabbing sensation. The right buttock feels cold. The pain is worse when bending, sitting, standing, walking, going from sitting to standing, in the morning and evening, and sitting with her weight on the right side . The pain is better in the afternoon and sitting with her weight on the left side . There is numbness in the right buttock. There is no tingling in the legs. There is weakness in the right leg which is mild.   No bowel or bladder changes     Past Medical History   Past Medical History:   Diagnosis Date    Migraines 2006    Under control with neurologist       Past Surgical History   Past Surgical History:   Procedure Laterality Date    D & C  2000    D & C  2007       Family History   Family History   Problem Relation Age of Onset    Heart Disease Father MI age 61    Other (Throat Cancer [Other]) Father     Hypertension Father     Stroke Father     Heart Disease Mother         MI age 62    Hypertension Mother     Diabetes Mother     Arrhythmia Mother     Heart Attack Mother     Stroke Mother     Other (Throat Cancer [Other]) Paternal Grandfather     Ovarian Cancer Maternal Grandmother     Diabetes Maternal Grandmother        Social History   Social History    Socioeconomic History      Marital status:       Spouse name: Not on file      Number of children: Not on file      Years of education: Not on file      Highest education level: Not on file    Occupational History      Occupation: [employer]    Tobacco Use      Smoking status: Never      Smokeless tobacco: Never    Vaping Use      Vaping Use: Never used    Substance and Sexual Activity      Alcohol use: No      Drug use: No      Sexual activity: Yes        Partners: Male        Birth control/protection: OCP        Comment: same clayton    Other Topics      Concerns:         Service: Not Asked        Blood Transfusions: Not Asked        Caffeine Concern: Not Asked        Occupational Exposure: Not Asked        Hobby Hazards: Not Asked        Sleep Concern: Not Asked        Stress Concern: Not Asked        Weight Concern: Not Asked        Special Diet: Not Asked        Back Care: Not Asked        Exercise: No        Bike Helmet: Not Asked        Seat Belt: Not Asked        Self-Exams: Not Asked    Social History Narrative      The patient uses the following assistive device(s):  quad cane. The patient does live in a home with stairs. Social Determinants of Health  Financial Resource Strain: Not on file  Food Insecurity: Not on file  Transportation Needs: Not on file  Physical Activity: Not on file  Stress: Not on file  Social Connections: Not on file  Housing Stability: Not on file    PE:  The patient does appear in her stated age in no distress.   The patient is well groomed. Psychiatric:  The patient is alert and oriented x 3. The patient has a normal affect and mood. Respiratory:  No acute respiratory distress. Patient does not have a cough. HEENT:  Extraocular muscles are intact. There is no kern icterus. Pupils are equal, round, and reactive to light. No redness or discharge bilaterally. Skin:  There are no rashes or lesions. Vitals:   07/17/23  1137   BP: 104/62   Pulse: 90       Gait:    Gait: Normal gait   Sit to Stand: mild difficulty      Lumbar Spine:    Scoliosis: No scoliosis present     Lumbar Spine Palpation:    Spinous Processes: Tender at L5 and S1   Z-joints: Tender at  right L5-S1   SIJ: Tender at right SIJ   Piriformis Muscle: Tender at right Piriformis muscle(s)   Greater Trochanteric Bursa: Tender at right Greater Trochanteric Bursa(s)     Vascular lower extremity:   Dorsalis pedis pulse-RIGHT 2+   Dorsalis pedis pulse-LEFT 2+   Tibialis posterior pulse-RIGHT 2+   Tibialis posterior pulse-LEFT 2+     Neurological Lower Extremity:    Light Touch Sensation: Intact in bilateral Lower Extremities   LE Muscle Strength: All LE strength measurements 5/5 except:  Hamstring RIGHT:   4+/5   RIGHT plantar reflexes: downward response   LEFT plantar reflexes: downward response   Reflexes: 2+ in bilateral lower extremities except:  Right medial hamstring which are 1+     Neural Tension Tests Lumbar Spine:  Sitting straight leg raise-RIGHT Positive for right buttock pain   Sitting straight leg raise-LEFT Positive for right buttock pain      Assessment  1. L3-4 mild central, L4-5 mild-mod central bulging discs    2. L5-S1 left moderate herniated disc    3. Lumbar facet joint syndrome: right L5-S1    4. right L5-S1 radiculopathy         Plan  I will perform right L5 TFESI(s). She will get back into the PT with Letty. If the injection does not help her, then she will need to have a new MRI of the lumbar spine.     She will take the Norco 7.5/325 as needed for the pain. The patient will follow up in 2-3 months, but the patient will call me 2 weeks after having the injection to let me know how the injection worked. The patient understands and agrees with the stated plan. Betina Najera MD  7/17/2023

## 2023-07-17 NOTE — PATIENT INSTRUCTIONS
Plan  I will perform right L5 TFESI(s). She will get back into the PT with Letty. If the injection does not help her, then she will need to have a new MRI of the lumbar spine. She will take the Norco 7.5/325 as needed for the pain. The patient will follow up in 2-3 months, but the patient will call me 2 weeks after having the injection to let me know how the injection worked.

## 2023-07-20 ENCOUNTER — TELEPHONE (OUTPATIENT)
Dept: PHYSICAL MEDICINE AND REHAB | Facility: CLINIC | Age: 48
End: 2023-07-20

## 2023-07-20 ENCOUNTER — PATIENT MESSAGE (OUTPATIENT)
Dept: PHYSICAL MEDICINE AND REHAB | Facility: CLINIC | Age: 48
End: 2023-07-20

## 2023-07-20 NOTE — TELEPHONE ENCOUNTER
Initiated authorization for Right L5 TFESI CPT 91644 with Kelly PALACIOS at [facility]  Case #[id number]  Status: Approved-authorization is not required per health plan

## 2023-07-21 NOTE — TELEPHONE ENCOUNTER
Patient has been scheduled for Right L5 TFESI   on 08/01/23 at the [facility] with . -Anesthesia type: LOCAL. -If scheduling [facility] covid testing required for all procedures whether patient is vaccinated or not. -Patient informed not to eat or drink anything after midnight the night prior to the procedure, if being sedated. (For afternoon injections: Patient to fast 6-8 hours prior to procedure with IVCS/MAC. )  -Patient was advised that if he/she does receive the covid vaccine it needs to be at least 2 weeks before or after the injection. -Medications and allergies reviewed. -Patient reminded to hold NSAIDs (Ibuprofen, ASA 81, Aleve, Naproxen, Mobic, Diclofenac, Etodolac, Celebrex etc.) for 3 days prior to [facility]  if BMI is greater than 35. For Cervical injections only hold multivitamins, Vitamin E, Fish Oil, Phentermine (Lomaira) for 7 days prior to injection and NSAIDS.  mg to be held for 7 days prior to injections.  -If patient is receiving MAC/IVCS Phentermine Teresita Sieve) will need to be held for 7 days prior to injection.  -If on blood thinner clearance has been received to hold this medication by provider.   -Patient informed he/she will need a  to and from procedure. -[facility] is located in the [facility] 1st floor. Patient may park in the yellow or purple parking. Follow up appointment has been scheduled for patient on: 11/01/23    Patient verbalized understanding and agrees with plan.  -----> Scheduled in Epic: Yes  -----> Scheduled in [employer]:  Yes

## 2023-07-25 ENCOUNTER — PATIENT MESSAGE (OUTPATIENT)
Dept: PHYSICAL MEDICINE AND REHAB | Facility: CLINIC | Age: 48
End: 2023-07-25

## 2023-07-25 DIAGNOSIS — M54.16 LUMBAR RADICULOPATHY: ICD-10-CM

## 2023-07-27 RX ORDER — HYDROCODONE BITARTRATE AND ACETAMINOPHEN 7.5; 325 MG/1; MG/1
1 TABLET ORAL EVERY 4 HOURS PRN
Qty: 30 TABLET | Refills: 0 | Status: SHIPPED | OUTPATIENT
Start: 2023-07-27

## 2023-07-27 NOTE — TELEPHONE ENCOUNTER
Refill Request    Medication request: HYDROcodone-acetaminophen 7.5-325 MG Oral Tab. Take 1 tablet by mouth every 4 (four) hours as needed for Pain. Christiano Breen MD   Due back to clinic per last office note: The patient will follow up in 2-3 months   NOV: 11/1/2023 Sissy Osuna MD      [facility]: 07/17/2023 #30 (5 days)    Urine drug screen (if applicable): none  Pain contract: none    LOV plan (if weaning or changing medications): Per Dr Karen Andre will take the Norco 7.5/325 as needed for the pain.  \"

## 2023-07-27 NOTE — TELEPHONE ENCOUNTER
From: King Graeme  To: Kumar Monsivais MD  Sent: 7/25/2023 10:43 PM CDT  Subject: Still in pain    Hello. I sent you a message a couple days ago regarding severe pain. I still did not receive any response on how to ease the pain till the injection. I'm not sure if you can send something different pr if you stil want me to take norco, and of yes, I am out of Norco. Please send a refill to [facility] in [place]. Thank you.   EF.

## 2023-08-01 ENCOUNTER — OFFICE VISIT (OUTPATIENT)
Dept: SURGERY | Facility: CLINIC | Age: 48
End: 2023-08-01
Payer: COMMERCIAL

## 2023-08-01 DIAGNOSIS — M51.9 LUMBAR DISC DISEASE: ICD-10-CM

## 2023-08-01 DIAGNOSIS — M54.16 LUMBAR RADICULOPATHY: Primary | ICD-10-CM

## 2023-08-01 PROCEDURE — 64483 NJX AA&/STRD TFRM EPI L/S 1: CPT | Performed by: PHYSICAL MEDICINE & REHABILITATION

## 2023-08-01 NOTE — PROCEDURES
[facility]    LUMBAR TRANSFORAMINAL   NAME:  Charmaine Eaton    MR #:    [id number] :  1975     PHYSICIAN:  Philomena Matute MD        Operative Report    DATE OF PROCEDURE: 2023   PREOPERATIVE DIAGNOSES: 1. right L5-S1 radiculopathy    2. L3-4 mild central, L4-5 mild-mod central bulging discs        POSTOPERATIVE DIAGNOSES:   1. right L5-S1 radiculopathy    2. L3-4 mild central, L4-5 mild-mod central bulging discs        PROCEDURES: right L5 transforaminal epidural steroid injection done under fluoroscopic guidance with contrast enhancement. SURGEON: Philomena Anderson MD   ANESTHESIA: Local   INDICATIONS:      OPERATIVE PROCEDURE:  Written consent was obtained from the patient. The patient was brought into the operating room and placed in the prone position on the fluoroscopy table with pillow underneath her abdomen. The patient's skin was cleaned and draped in a normal sterile fashion. Using AP fluoroscopy, all five lumbar vertebrae were identified. When the fifth vertebra was identified, fluoroscopy was left anterior obliqued opening up the right L5-S1 intervertebral foramen. At this point in time, the patient's skin was anesthetized with 1% PF lidocaine without epinephrine. Then, a 5 inch, 22-gauge spinal needle was inserted and directed towards the right L5-S1 intervertebral foramen. When it felt to be in good position, AP fluoroscopy was used to advance the needle to the 6 o'clock position on the right L5 pedicle. At this point in time, Omnipaque-240 contrast was used to obtain a good epidurogram indicating correct needle placement. Then, aspiration was performed. No blood, fluid, or air was aspirated. Then, the patient was injected with a 4 cc solution of 2 cc of 40 mg/cc of Kenalog and 2 cc of 1% PF lidocaine without epinephrine. After this, the needle was removed. The patient's skin was cleaned. A Band-Aid was applied.   The patient was transferred to the cart and into PAR. The patient was given discharge instructions and will follow up in the clinic as scheduled. Throughout the whole procedure, the patient's pulse oximetry and vital signs were monitored and they remained completely stable. Also, throughout the whole procedure, prior to injection of any medication, aspiration was performed. No blood, fluid, or air was aspirated at anytime.

## 2023-08-03 ENCOUNTER — PATIENT MESSAGE (OUTPATIENT)
Dept: PHYSICAL MEDICINE AND REHAB | Facility: CLINIC | Age: 48
End: 2023-08-03

## 2023-08-03 NOTE — TELEPHONE ENCOUNTER
Patient had right L5 transforaminal epidural steroid injection on 8/1/23. Plan per Elie Padilla at [address] [place] [address] 7/17/23:  \"I will perform right L5 TFESI(s). She will get back into the PT with Letty. If the injection does not help her, then she will need to have a new MRI of the lumbar spine. She will take the Norco 7.5/325 as needed for the pain. The patient will follow up in 2-3 months, but the patient will call me 2 weeks after having the injection to let me know how the injection worked. \"    NOV: 11/1/23    Per Patient update: \"Good morning Dr Elina Silva. I want to rhnak you for doing the procedure on me Tuesday. The pain is less intense and comes and goes. I feel some stiffness . It's more tolerable, but I still cannot drive. Last night I decided to try driving a little to see how am I going to be today for work. I lasted about 5 minutes in the car. The stiffness and the pain made me feel lime my leg is numb. This morning I lasted about 10 minutes, and i returned home. I really  need to rest my back a little more. I would greatly appreciate if you can let me rest till end of this week. \"    Patient's last work note states patient may return to work today 8/3/23. Spoke with patient who was asking to be off today and tomorrow and go back on Monday. States driving and pressing the gas pedal is still giving her pain. Patient asked for not to be sent via [employer]. Ok if there is not a hand written signature. Message forwarded to Elie Padilla to advise on updated work note. Work note pended.

## 2023-08-03 NOTE — TELEPHONE ENCOUNTER
From: Mary Lawrence  To: Anjel Carter MD  Sent: 8/3/2023 10:23 AM CDT  Subject: Post procedure condition     Good morning Dr Maribel Carter. I want to rhnak you for doing the procedure on me Tuesday. The pain is less intense and comes and goes. I feel some stiffness . It's more tolerable, but I still cannot drive. Last night I decided to try driving a little to see how am I going to be today for work. I lasted about 5 minutes in the car. The stiffness and the pain made me feel lime my leg is numb. This morning I lasted about 10 minutes, and i returned home. I really need to rest my back a little more. I would greatly appreciate if you can let me rest till end of this week.

## 2023-08-07 NOTE — TELEPHONE ENCOUNTER
Message below noted. Work note was sent to [employer] on 8/4/23. Nothing further needed at this time.

## 2023-09-18 ENCOUNTER — PATIENT MESSAGE (OUTPATIENT)
Dept: PHYSICAL MEDICINE AND REHAB | Facility: CLINIC | Age: 48
End: 2023-09-18

## 2023-09-18 DIAGNOSIS — M54.16 LUMBAR RADICULOPATHY: Primary | ICD-10-CM

## 2023-09-21 ENCOUNTER — TELEPHONE (OUTPATIENT)
Dept: PHYSICAL MEDICINE AND REHAB | Facility: CLINIC | Age: 48
End: 2023-09-21

## 2023-09-21 DIAGNOSIS — M54.16 LUMBAR RADICULOPATHY: Primary | ICD-10-CM

## 2023-09-21 NOTE — TELEPHONE ENCOUNTER
Initiated authorization for Right L5-S1 Lumbar Transforaminal Epidural Injection with Fluoroscopy procedure.  Dx code M54.16 facility [address] [place] [address] with [address] CPT Code 88693 Spoke with Carter Zhong through [facility] Reference # [id number]  Status No Auth required

## 2023-09-21 NOTE — TELEPHONE ENCOUNTER
From: Rosa Davison  To: Yana Anderson  Sent: 9/18/2023 4:58 PM CDT  Subject: Post procedure follow up     Good afternoon Dr. Glenis Pleitez, I hope all is well. I had my shot done end of July, and I was doing good. (Had some discomfort from time to time whe. I sat or was laying down in a wrong position, but it felt like ants crawling. nothing that required me to take pain meds.) I Did some home stretches, walking a little, and I was ready to start physical therapy when I started to have pains just like before. It started this past Friday morning. When I was up doing some chores and lasted a few minutes. Once I sat down I was ok. Saturday I was ok ik the morning, then I went to a hockey game in the late afternoon and had to sit and then stand up as I felt a little pain (3 out of 10.) Today- Monday I was ok in the morning, but when I went to the store to do grocoeries I was in severe pain (7 out of 10.) Came back home had to erum down and the pain went down (2 out of 10.)   Do you want me to do therapy or see you first?  Thank you in advance for your response.      Rosa Davison

## 2023-09-21 NOTE — TELEPHONE ENCOUNTER
Plan per Belen Jones at [address] [place] [address] 7/17/23:  \"Plan  I will perform right L5 TFESI(s). She will get back into the PT with Letty. If the injection does not help her, then she will need to have a new MRI of the lumbar spine. She will take the Norco 7.5/325 as needed for the pain. The patient will follow up in 2-3 months, but the patient will call me 2 weeks after having the injection to let me know how the injection worked. \"      Last injection: 8/1/23- right L5 transforaminal epidural steroid injection   NOV: 11/1/23      Patient update forwarded to Belen Jones. Awaiting recommendations.

## 2023-09-22 NOTE — TELEPHONE ENCOUNTER
Patient has been scheduled for Right L5-S1 Lumbar Transforaminal Epidural Injection with Fluoroscopy procedure on 10/3/2023 at the [address] [place] [address] with Dr. Polly Cook. -Anesthesia type: Local.  -Patient was advised that if he/she does receive the covid vaccine it needs to be at least 2 weeks before or after the injection. -Medications and allergies reviewed. -Patient reminded to hold NSAIDs (Ibuprofen, ASA 81, Aleve, Naproxen, Mobic, Diclofenac, Etodolac, Celebrex etc.) for 3 days prior to Lumbar MBB/Facet if BMI is greater than 35. For Cervical injections only hold multivitamins, Vitamin E, Fish Oil, Phentermine/Lomaira for 7 days prior to injection and NSAIDS.  mg to be held for 7 days prior to injections.  -If patient is receiving MAC/IVCS, weight loss oral/injectable medications will need to be held for 7 days prior to injection.  -Patient informed to fast 12 hours prior to procedure with IVCS/MAC.   -If on blood thinner, clearance has been received and approved to hold this medication by provider.   -Patient informed he/she will need a  to and from procedure. [facility] is located in the [facility] 1st floor,  may park in the yellow/purple parking lot. Patient verbalized understanding and agrees with plan. Scheduled in Epic: Yes  Scheduled in Surgical Case: Yes  Follow up appointment made:  No

## 2023-11-01 ENCOUNTER — OFFICE VISIT (OUTPATIENT)
Dept: PHYSICAL MEDICINE AND REHAB | Facility: CLINIC | Age: 48
End: 2023-11-01
Payer: COMMERCIAL

## 2023-11-01 VITALS — HEART RATE: 83 BPM | BODY MASS INDEX: 28 KG/M2 | OXYGEN SATURATION: 100 % | WEIGHT: 175 LBS

## 2023-11-01 DIAGNOSIS — M47.816 LUMBAR FACET JOINT SYNDROME: ICD-10-CM

## 2023-11-01 DIAGNOSIS — M51.9 LUMBAR DISC DISEASE: Primary | ICD-10-CM

## 2023-11-01 DIAGNOSIS — M54.16 LUMBAR RADICULOPATHY: ICD-10-CM

## 2023-11-01 DIAGNOSIS — M51.26 LUMBAR HERNIATED DISC: ICD-10-CM

## 2023-11-01 DIAGNOSIS — M47.816 LUMBAR FACET ARTHROPATHY: ICD-10-CM

## 2023-11-01 PROCEDURE — 99214 OFFICE O/P EST MOD 30 MIN: CPT | Performed by: PHYSICAL MEDICINE & REHABILITATION

## 2023-11-01 RX ORDER — HYDROCODONE BITARTRATE AND ACETAMINOPHEN 5; 325 MG/1; MG/1
1 TABLET ORAL EVERY 6 HOURS PRN
Qty: 20 TABLET | Refills: 0 | Status: SHIPPED | OUTPATIENT
Start: 2023-11-01 | End: 2023-12-01

## 2023-11-01 NOTE — PROGRESS NOTES
Low Back Pain H & P    Chief Complaint: Patient presents with: Follow - Up: 10/03/23 Right L5-S1 TFESI. 08/01/23 R L5 TFESI. 07/17/23 LOV. States 30% improvement so far. Pain 2/10. Tingling in R low back and R buttock. Norco prn. Nursing note reviewed and verified. Patient was last seen on 7/17/2023. On 8/1/2023, I did a right L5 TFESI which helped fairly significantly for 6 weeks and then the pain returned. I did the repeat injection on 10/3/2023 which increased her pain for 2 weeks and then the pain started to improve. She is now 30% better. Description of the Pain  The pain is located in the right buttock. The pain does not radiate. .  The pain at its best is 0/10. The pain at its worst is 7/10. The pain is currently  0/10. The pain is described as a(n)  shooting  sensation. The pain is worse standing, walking, and laying down straight or laying on the right side . The pain is better sitting. There is no numbness. There is tingling in the right buttock. There is not weakness in both legs.      Past Medical History   Past Medical History:   Diagnosis Date    Migraines 2006    Under control with neurologist       Past Surgical History   Past Surgical History:   Procedure Laterality Date    D & C  01/01/2000    D & C  01/01/2007    TUBAL LIGATION  2019       Family History   Family History   Problem Relation Age of Onset    Heart Disease Father         MI age 61    Other (Throat Cancer [Other]) Father     Hypertension Father     Stroke Father     Heart Disease Mother         MI age 62    Hypertension Mother     Diabetes Mother     Arrhythmia Mother     Heart Attack Mother     Stroke Mother     Other (Throat Cancer [Other]) Paternal Grandfather     Ovarian Cancer Maternal Grandmother     Diabetes Maternal Grandmother        Social History   Social History    Socioeconomic History      Marital status:       Spouse name: Not on file      Number of children: Not on file      Years of education: Not on file      Highest education level: Not on file    Occupational History      Occupation: [employer]    Tobacco Use      Smoking status: Never      Smokeless tobacco: Never    Vaping Use      Vaping Use: Never used    Substance and Sexual Activity      Alcohol use: No      Drug use: No      Sexual activity: Yes        Partners: Male        Birth control/protection: OCP        Comment: same parsarbjit    Other Topics      Concerns:         Service: Not Asked        Blood Transfusions: Not Asked        Caffeine Concern: Not Asked        Occupational Exposure: Not Asked        Hobby Hazards: Not Asked        Sleep Concern: Not Asked        Stress Concern: Not Asked        Weight Concern: Not Asked        Special Diet: Not Asked        Back Care: Not Asked        Exercise: No        Bike Helmet: Not Asked        Seat Belt: Not Asked        Self-Exams: Not Asked    Social History Narrative      The patient uses the following assistive device(s):  quad cane. The patient does live in a home with stairs. Social Determinants of Health  Financial Resource Strain: Not on file  Food Insecurity: Not on file  Transportation Needs: Not on file  Physical Activity: Not on file  Stress: Not on file  Social Connections: Not on file  Housing Stability: Not on file    PE:  The patient does appear in her stated age in no distress. The patient is well groomed. Psychiatric:  The patient is alert and oriented x 3. The patient has a normal affect and mood. Respiratory:  No acute respiratory distress. Patient does not have a cough. HEENT:  Extraocular muscles are intact. There is no kern icterus. Pupils are equal, round, and reactive to light. No redness or discharge bilaterally. Skin:  There are no rashes or lesions.     Vitals:   11/01/23  0839   Pulse: 83       Gait:    Gait: Normal gait   Sit to Stand: no difficulty    RIGHT Walking on Toes: no difficulty   LEFT Walking on Toes: no difficulty   RIGHT Walking on Heels: no difficulty   LEFT Walking on Heels: no difficulty     Lumbar Spine:    Scoliosis: No scoliosis present   Lumbar Flexion: 80 degrees Painless   Lumbar Extension: 30 degrees Painless     Lumbar Spine Palpation:    Spinous Processes: Non-tender for all Spinous Processes   Z-joints: Non-tender for all Z-joints   SIJ: Tender at right SIJ   Piriformis Muscle: Non-tender bilateral Piriformis muscles   Greater Trochanteric Bursa: Non-tender for bilateral Greater Trochanteric Bursa     Vascular lower extremity:   Dorsalis pedis pulse-RIGHT 2+   Dorsalis pedis pulse-LEFT 2+   Tibialis posterior pulse-RIGHT 2+   Tibialis posterior pulse-LEFT 2+     Neurological Lower Extremity:    Light Touch Sensation: Intact in bilateral LE except:  Decreased in the  lateral RIGHT foot   LE Muscle Strength: All LE strength measurements 5/5 except:  Hamstring RIGHT:   4+/5   RIGHT plantar reflexes: downward response   LEFT plantar reflexes: downward response   Reflexes: 2+ in bilateral lower extremities     Neural Tension Tests Lumbar Spine:  Sitting straight leg raise-RIGHT Positive for right posterior leg pain   Sitting straight leg raise-LEFT Negative for pain   Slump test-RIGHT Positive for right posterior leg pain   Slump test-LEFT Negative for pain     Assessment  1. L3-4 mild central, L4-5 mild-mod central bulging discs    2. L5-S1 left moderate herniated disc    3. Lumbar facet joint syndrome: right L5-S1    4. right L5-S1 radiculopathy    5. Lumbar facet arthropathy         Plan  She will get a new MRI of the lumbar spine. She will get back into the PT for the lumbar spine. She will take the Norco as needed for the pain. She will follow up after having the MRI. The patient understands and agrees with the stated plan. Sharmin Amaral MD  11/1/2023

## 2023-11-01 NOTE — PATIENT INSTRUCTIONS
Plan  She will get a new MRI of the lumbar spine. She will get back into the PT for the lumbar spine. She will take the Norco as needed for the pain. She will follow up after having the MRI.

## 2023-12-01 ENCOUNTER — PATIENT MESSAGE (OUTPATIENT)
Dept: PHYSICAL MEDICINE AND REHAB | Facility: CLINIC | Age: 48
End: 2023-12-01

## 2023-12-04 NOTE — TELEPHONE ENCOUNTER
From: Saroj Azevedo  To: Toshia Anderson  Sent: 12/1/2023 1:51 PM CST  Subject: Letter    Hello. I apologize for bothering you again. I am starting a new job that requires a drug test and despite the fact that I take norco occasionally, I will need a letter stating that it is prescribed by the doctor. I would greatly appreciate if you generate one and I will pick it up from the office. Thank you in advance for your help. Have a great weekend.    Saroj Azevedo

## 2023-12-08 ENCOUNTER — TELEPHONE (OUTPATIENT)
Dept: PHYSICAL MEDICINE AND REHAB | Facility: CLINIC | Age: 48
End: 2023-12-08

## 2023-12-08 NOTE — TELEPHONE ENCOUNTER
Dr. Stacie Fontanez from [address] [place] [address] calling stating patient completed LUMBAR MRI today (12/08/2023). Dr. Stacie Fontanez stating that L5-S1 shows a severe disc protrusion causing significant narrowing of the spinal canal/compression to her nerves. Dr. Stacie Fontanez stating that his findings suggest that patient is at a high risk of cauda equina syndrome. RN gave Dr. Stacie Fontanez her name for the record. These findings will be sent to Dr. Talita Foley as high priority for his review.

## 2023-12-08 NOTE — TELEPHONE ENCOUNTER
Per Dr. Adolfo Bell: Maria Del Rosario Blackmon can come in for an appointment with her disc on Monday at 8:30. \"    S/w patient who has been scheduled for 12/11/2023 for MRI follow up.  Pt confirms she has the CD of her MRI and confirms she will bring this disc to her appointment on Monday - 12/11/2023 at 8:30AM.

## 2023-12-11 ENCOUNTER — OFFICE VISIT (OUTPATIENT)
Dept: PHYSICAL MEDICINE AND REHAB | Facility: CLINIC | Age: 48
End: 2023-12-11
Payer: COMMERCIAL

## 2023-12-11 ENCOUNTER — MED REC SCAN ONLY (OUTPATIENT)
Dept: PHYSICAL MEDICINE AND REHAB | Facility: CLINIC | Age: 48
End: 2023-12-11

## 2023-12-11 VITALS — BODY MASS INDEX: 28.13 KG/M2 | OXYGEN SATURATION: 97 % | WEIGHT: 175 LBS | HEART RATE: 83 BPM | HEIGHT: 66 IN

## 2023-12-11 DIAGNOSIS — M54.16 LUMBAR RADICULOPATHY: Primary | ICD-10-CM

## 2023-12-11 DIAGNOSIS — M51.9 LUMBAR DISC DISEASE: ICD-10-CM

## 2023-12-11 DIAGNOSIS — M48.062 SPINAL STENOSIS OF LUMBAR REGION WITH NEUROGENIC CLAUDICATION: ICD-10-CM

## 2023-12-11 DIAGNOSIS — M51.26 LUMBAR HERNIATED DISC: ICD-10-CM

## 2023-12-11 PROCEDURE — 3008F BODY MASS INDEX DOCD: CPT | Performed by: PHYSICAL MEDICINE & REHABILITATION

## 2023-12-11 PROCEDURE — 99214 OFFICE O/P EST MOD 30 MIN: CPT | Performed by: PHYSICAL MEDICINE & REHABILITATION

## 2023-12-11 NOTE — PROGRESS NOTES
Low Back Pain H & P    Chief Complaint:   Chief Complaint   Patient presents with    Results     LOV 11/01/23 PT is here to for a follow up after MRI. Tingling in right buttocks and states it's localized /Takes norco PRN to ease the pain. No PT, Pain 1/10     Nursing note reviewed and verified. Patient was last seen on 11/1/2023. She is following up after having the MRI of the lumbar spine. Description of the Pain  The pain is located in the right low back and right pelvic area. The pain radiates to the right buttock. She did have one day of right calf aching. The pain at its best is 1/10. The pain at its worst is 8/10. The pain is described as a(n) burning sensation. The pain is worse standing and walking. The pain is better sitting. There is no numbness. There is tingling in the right buttock. There is not weakness in both legs. No bowel or bladder changes.   She has chronic constipation and pelvic floor dysfunction    Past Medical History   Past Medical History:   Diagnosis Date    Migraines 2006    Under control with neurologist       Past Surgical History   Past Surgical History:   Procedure Laterality Date    D & C  01/01/2000    D & C  01/01/2007    TUBAL LIGATION  2019       Family History   Family History   Problem Relation Age of Onset    Heart Disease Father         MI age 61    Other (Throat Cancer [Other]) Father     Hypertension Father     Stroke Father     Heart Disease Mother         MI age 62    Hypertension Mother     Diabetes Mother     Arrhythmia Mother     Heart Attack Mother     Stroke Mother     Other (Throat Cancer [Other]) Paternal Grandfather     Ovarian Cancer Maternal Grandmother     Diabetes Maternal Grandmother        Social History   Social History     Socioeconomic History    Marital status:      Spouse name: Not on file    Number of children: Not on file    Years of education: Not on file    Highest education level: Not on file   Occupational History Occupation: [employer]   Tobacco Use    Smoking status: Never    Smokeless tobacco: Never   Vaping Use    Vaping Use: Never used   Substance and Sexual Activity    Alcohol use: No    Drug use: No    Sexual activity: Yes     Partners: Male     Birth control/protection: OCP     Comment: same clayton   Other Topics Concern     Service Not Asked    Blood Transfusions Not Asked    Caffeine Concern Not Asked    Occupational Exposure Not Asked    Hobby Hazards Not Asked    Sleep Concern Not Asked    Stress Concern Not Asked    Weight Concern Not Asked    Special Diet Not Asked    Back Care Not Asked    Exercise No    Bike Helmet Not Asked    Seat Belt Not Asked    Self-Exams Not Asked   Social History Narrative    The patient uses the following assistive device(s):  quad cane. The patient does live in a home with stairs. Social Determinants of Health     Financial Resource Strain: Not on file   Food Insecurity: Not on file   Transportation Needs: Not on file   Physical Activity: Not on file   Stress: Not on file   Social Connections: Not on file   Housing Stability: Not on file       PE:  The patient does appear in her stated age in no distress. The patient is well groomed. Psychiatric:  The patient is alert and oriented x 3. The patient has a normal affect and mood. Respiratory:  No acute respiratory distress. Patient does not have a cough. HEENT:  Extraocular muscles are intact. There is no kern icterus. Pupils are equal, round, and reactive to light. No redness or discharge bilaterally. Skin:  There are no rashes or lesions.     Vitals:  Vitals:    12/11/23 0814   Pulse: 83       Gait:    Gait: Normal gait   Sit to Stand: no difficulty      Vascular lower extremity:   Dorsalis pedis pulse-RIGHT 2+   Dorsalis pedis pulse-LEFT 2+   Tibialis posterior pulse-RIGHT 2+   Tibialis posterior pulse-LEFT 2+     Neurological Lower Extremity:    Light Touch Sensation: Intact in bilateral Lower Extremities   LE Muscle Strength: All LE strength measurements 5/5 except:  Hamstring RIGHT:   4+/5  Hamstring LEFT:   4+/5   RIGHT plantar reflexes: downward response   LEFT plantar reflexes: downward response   Reflexes: 2+ in bilateral lower extremities     Radiology Imaging:  I reviewed with the patient her MRI of the lumbar spine from 12/8/2023. Assessment  1. right > left L5-S1 radiculopathy    2. L5-S1 large central herniated disc    3. L3-4 mild central, L4-5 left paracentral mild-mod bulging discs    4. L5-S1 severe central stenosis         Plan  I will perform right L5 and S1 TFESI(s) with Dexamethasone. She will get back into the PT. She will meet with a neurosurgeon. The patient will follow up in 2-3 months, but the patient will call me 2 weeks after having the injection to let me know how the injection worked. The patient understands and agrees with the stated plan. Zoey Chavez MD  12/11/2023

## 2023-12-11 NOTE — PATIENT INSTRUCTIONS
Plan  I will perform right L5 and S1 TFESI(s) with Dexamethasone. She will get back into the PT. She will meet with a neurosurgeon. The patient will follow up in 2-3 months, but the patient will call me 2 weeks after having the injection to let me know how the injection worked.

## 2023-12-13 ENCOUNTER — TELEPHONE (OUTPATIENT)
Dept: SURGERY | Facility: CLINIC | Age: 48
End: 2023-12-13

## 2023-12-13 ENCOUNTER — OFFICE VISIT (OUTPATIENT)
Dept: SURGERY | Facility: CLINIC | Age: 48
End: 2023-12-13
Payer: COMMERCIAL

## 2023-12-13 VITALS — HEIGHT: 66 IN | BODY MASS INDEX: 28.13 KG/M2 | WEIGHT: 175 LBS

## 2023-12-13 DIAGNOSIS — M54.16 LUMBAR RADICULOPATHY: Primary | ICD-10-CM

## 2023-12-13 DIAGNOSIS — M51.16 LUMBAR DISC HERNIATION WITH RADICULOPATHY: ICD-10-CM

## 2023-12-13 DIAGNOSIS — M54.16 LUMBAR RADICULOPATHY: ICD-10-CM

## 2023-12-13 PROCEDURE — 3008F BODY MASS INDEX DOCD: CPT | Performed by: STUDENT IN AN ORGANIZED HEALTH CARE EDUCATION/TRAINING PROGRAM

## 2023-12-13 PROCEDURE — 99205 OFFICE O/P NEW HI 60 MIN: CPT | Performed by: STUDENT IN AN ORGANIZED HEALTH CARE EDUCATION/TRAINING PROGRAM

## 2023-12-13 NOTE — PROGRESS NOTES
Ref by Dr Bibiana Galvan. Pt is having low back pain radiates down to buttocks. No N/T or weakness. Pt did rcvd injections- very minimal. Pt 2 years ago- no relief. No h/O neck or back surgery. Pt did had MRI Lumbar from [facility].

## 2023-12-13 NOTE — TELEPHONE ENCOUNTER
Refill Request    Medication request: HYDROcodone-acetaminophen 7.5-325 MG Oral Tab Take 1 tablet by mouth every 4 (four) hours as needed for Pain. QSV:91/16/0595 Fanny Travis MD   Due back to clinic per last office note:  \"follow up in 2-3 months \"  Hamp Kit: 2/14/2024 Fanny Travis MD      [place]: 11/1/23 #20- 5 day supply    Urine drug screen (if applicable): none  Pain contract: none    LOV plan (if weaning or changing medications): No mention of [place] at [facility]. Per OV 11/1/23: \"She will take the Norco as needed for the pain.  \"

## 2023-12-13 NOTE — H&P
[facility]  Neurological Surgery New Patient Clinic Note    Trish Rowe  9/23/1975  [id number]  PCP: Alejandra Self DO  Referring Provider: Lyndsay Chavez MD    REASON FOR VISIT:  Lumbar disc herniation with radiculopathy    HISTORY OF PRESENT ILLNESS:  Trish Rowe is a(n) 57year old female with no significant past medical history who is referred for evaluation of low back pain with radiation. She is a very healthy and active woman who has been dealing with some spinal issues for some time. She has been dealing with some back pain for almost a year, but with a recent exacerbation of her pain. She has been followed by Dr. Shelby Mann for management of her back pain. She most recently has developed severe bilateral buttock pain, and pain involving her saddle area as. She has done physical therapy, she also does activity on her own, she has tried anti-inflammatory medications, neuromodulating agents, and a couple of epidurals steroid injections without success. She had an MRI of her lumbar spine on 12/8/2023, which demonstrated a very large L5-S1 disc herniation. She reports chronic constipation, as well as symptoms of pelvic floor dysfunction for which she has had therapy. She otherwise denies any other red flags. PAST MEDICAL HISTORY:  Past Medical History:   Diagnosis Date    Migraines 2006    Under control with neurologist     PAST SURGICAL HISTORY:  Past Surgical History:   Procedure Laterality Date    D & C  01/01/2000    D & C  01/01/2007    TUBAL LIGATION  2019     FAMILY HISTORY:  family history includes Arrhythmia in her mother; Diabetes in her maternal grandmother and mother; Heart Attack in her mother; Heart Disease in her father and mother; Hypertension in her father and mother; Ovarian Cancer in her maternal grandmother; Stroke in her father and mother; Throat Cancer in her father and paternal grandfather. SOCIAL HISTORY:   reports that she has never smoked.  She has never used smokeless tobacco. She reports that she does not drink alcohol and does not use drugs. ALLERGIES:  No Known Allergies    MEDICATIONS:  Current Outpatient Medications on File Prior to Visit   Medication Sig Dispense Refill    HYDROcodone-acetaminophen 7.5-325 MG Oral Tab Take 1 tablet by mouth every 4 (four) hours as needed for Pain. 30 tablet 0    nortriptyline 10 MG Oral Cap Take 4 capsules (40 mg total) by mouth nightly. SUMAtriptan Succinate 100 MG Oral Tab 1 po prn migraine, may repeat x 1 in 2 hours. NTE 2 tabs in 24 hours. No current facility-administered medications on file prior to visit. REVIEW OF SYSTEMS:  All other systems were reviewed and were negative except for those previously mentioned in the HPI     PHYSICAL EXAMINATION:  General: No acute distress. Respiratory: Non-labored respirations bilaterally. No audible wheezing  Cardiovascular: Extremities warm and well-perfused. Abdomen: Soft, nontender, nondistended. Musculoskeletal: Moves all extremities well, symmetrically. Extremities: No edema.     NEUROLOGIC EXAMINATION:  Mental status: Alert and oriented x 3  Speech: Clear, fluent  Cranial nerves: PERRLA, EOMI, face symmetric, with normal strength and sensation, tongue and palate midline, SCM 5/5 bilaterally  Motor:     RIGHT  Delt 5/5   Bic 5/5  Tri 5/5   HI 5/5    5/5  IP 5/5   Quad 5/5   Ham 5/5   AT 5/5   EHL 5/5 Rebecca 5/5     LEFT    Delt 5/5   Bic 5/5  Tri 5/5   HI 5/5    5/5  IP 5/5   Quad 5/5   Ham 5/5   AT 5/5   EHL 5/5 Rebecca 4+/5   No pronator drift  Tone: Normal  Atrophy/Fasciculations: None  Sensation: Normal to light touch, symmetric, no neglect  Cerebellar: Normal finger nose finger  Gait: Normal, nondistressed heel toe tandem gait      Reflexes: 2+ throughout, symmetric, no Neymar's     IMAGING:  MR lumbar 12/8/2023: Mild lumbar spondylosis throughout the lumbar spine, with the exception of L5-S1 where there is significant disc degeneration, and superimposed large central L5-S1 disc herniation compressing the lower sacral nerve roots. Surprisingly, and manages to avoid the S1 nerve roots in their entirety. ASSESSMENT:  Ms. Armon Osgood presents with signs and symptoms of lower sacral polyradiculopathies. These are referable to her imaging findings demonstrating a large L5-S1 disc herniation with compression of the lower sacral nerve roots. She has some slight left plantarflexion weakness on examination. Surprisingly, most of her pain is localized to the saddle areas, consistent with the expected dermatomal distribution based on her imaging. At this point in time, she has attempted aggressive medical management including physical therapy, additional physical activity on her own, anti-inflammatory medications, neuromodulating agents, as well as a couple of epidural steroid injections. Given the circumstances, I favor surgical decompression via minimally invasive L5-S1 bilateral laminoforaminotomies, discectomy, approaching from the left. We had a detailed discussion regarding risk, benefits, alternatives. She would like to proceed, although for work reasons she would like to attempt another injection and continued conservative management for the time being. Will plan for surgical management in January/February. Plan:  -Minimally invasive L5-S1 bilateral laminoforaminotomies for discectomy, approaching from the left sometime early 2024    Trang Duque MD  Neurological [address] [address] [address] [place] [address]  [phone]  Pager   12/13/2023 9:14 AM      This note was created using a voice-recognition transcribing system. Incorrect words or phrases may have been missed during proofreading. Please interpret accordingly. Total Time    New Patient Total Time       60  minutes. Activities       Preparing to see the patient (chart/tests/imaging review). Obtaining and/or reviewing separately obtained history. Performing a medically appropriate examination and/or evaluation. Counseling and educating the patient/family/caregiver. Ordering medications, tests, or procedures. Referring and communicating with other health care professionals (when not separately reported). Documenting clincal information in the electronic or other health record. Independently interpreting results (not separately reported). Communicating results to the patient/family/caregiver. Care coordination (not separately reported).

## 2023-12-13 NOTE — TELEPHONE ENCOUNTER
Pt brought in MRI Lumbar Spine WO contrast from bright light dated 12/8/23 along with report. Disc was uploaded to pacs and a copy of the report was made. Will send to scan. /disc returned to pt at time of visit.

## 2023-12-14 ENCOUNTER — TELEPHONE (OUTPATIENT)
Dept: SURGERY | Facility: CLINIC | Age: 48
End: 2023-12-14

## 2023-12-14 DIAGNOSIS — M51.9 LUMBAR DISC DISEASE: ICD-10-CM

## 2023-12-14 DIAGNOSIS — M47.816 LUMBAR SPONDYLOSIS: ICD-10-CM

## 2023-12-14 DIAGNOSIS — M54.16 LUMBAR RADICULOPATHY: Primary | ICD-10-CM

## 2023-12-14 DIAGNOSIS — M51.16 LUMBAR DISC HERNIATION WITH RADICULOPATHY: ICD-10-CM

## 2023-12-15 RX ORDER — HYDROCODONE BITARTRATE AND ACETAMINOPHEN 7.5; 325 MG/1; MG/1
1 TABLET ORAL EVERY 4 HOURS PRN
Qty: 30 TABLET | Refills: 0 | Status: SHIPPED | OUTPATIENT
Start: 2023-12-15

## 2023-12-19 ENCOUNTER — TELEPHONE (OUTPATIENT)
Dept: PHYSICAL MEDICINE AND REHAB | Facility: CLINIC | Age: 48
End: 2023-12-19

## 2023-12-19 NOTE — TELEPHONE ENCOUNTER
Initiated authorization for Right L5 and Right S1 TFESIs CPT [id number], [id number] with Kassy Mccoy at [address] [place] [address] #[id number]/23  Status: Approved-authorization is not required per health plan    Per Dr. Butch Dunham under IVCS w/ Dexamethasone

## 2023-12-22 NOTE — TELEPHONE ENCOUNTER
Spoke with Pt and she stated that she is beginning a new project at work and she doesn't know her new schedule yet. Pt stated that she will call us back in January so she can schedule the injection.

## 2024-01-10 NOTE — TELEPHONE ENCOUNTER
Patient is scheduled for MINIMALLY INVASIVE LUMBAR 5 TO SACRAL 1 BILATERAL LAMINOFORAMINOTOMIES APPROACHING FROM THE LEFT, MICRODISCECTOMY  on 2-6-24 with Dr Dominique at [facility].    Y pre-op apt scheduled (if sx is more than 30 days from last apt)  Ypre-op apt scheduled with RN spine navigator  Y Surgical instructions reviewed by nursing staff with patient  Y  form completed  Y Surgery order signed   Y Placed sx on surgery sheet  Y Placed on outlook calendar  Y [employer] message sent to patient with sx instructions  Y Faxed pre-op clearance request to PCP Dr Gonzalez   N/a Faxed letter to prescribing provider requesting anticoagulants be held for surgery  N/a E-mail sent to [employer]  Y Post-op appointments made  Y SAHIL ALBA. Routed to PA team to initiate.  Y Post-Op outreach pt reminder placed.   Y Entire Neurosurgery Checklist Completed    Mailed patient a copy of instructions as requested.

## 2024-01-10 NOTE — TELEPHONE ENCOUNTER
From: Cher Goldman  Sent: 1/8/2024 3:54 PM CST  To: Enid Paul 2 Nurse  Subject: Scheduling Surgery    Thank you for reaching out to me. I just spoke with my  and we can set something for the first Wednesday of February (or call me with the days he does surgries). Please let me know the exact date, as I have to submit the replacement for my work.   Thank you,   Cher Goldman

## 2024-01-10 NOTE — TELEPHONE ENCOUNTER
You are scheduled for MINIMALLY INVASIVE LUMBAR 5 TO SACRAL 1 BILATERAL LAMINOFORAMINOTOMIES APPROACHING FROM THE LEFT, MICRODISCECTOMY  on 2-6-24 with  at [facility].     PCP clearance is needed within 30 days of surgery.  We have faxed a request for pre-op clearance to your primary care physician Dr Gonzalez. Please contact their office for appointment.  Please schedule this appointment AT LEAST 1 WEEK PRIOR TO SURGERY DATE.  Your PCP may order additional testing or clearance from another specialist.   You will have an appointment with our RN Spine Navigator prior to surgery.  This is an educational telehealth/phone visit in which you will receive more information on the specifics of your surgery, instructions for before surgery, what to expect in the hospital and how to take care of yourself after surgery.  Your surgeon wants you to to participate in this visit so that you are as prepared for surgery as possible and have an opportunity to ask questions.  If possible, we would like your care partner to be present for the visit as well.    You will need to contact the Pre-admission department at [phone]. The Pre-Admission nurse will review your health history and give you information for day-of instructions. The nurse will also get you scheduled for all your pre-op testing required for your surgery.   You will need pre-operative labs which will include blood work and a MRSA/MSSA test (nasal swab). You will need for fast for the blood work. You may also need an EKG and/or chest x-ray depending on your current health status.    You should have nothing to eat or drink after midnight the night prior to surgery except for the following:   Do drink 12 ounces of regular Gatorade (NOT RED) 12 hours and 4 hours prior to your scheduled surgery time. Do not drink any other liquids (including water) before your surgery. Take 1000 mg of Tylenol (Acetaminophen) 4 hours before your scheduled surgery time, take  this with your scheduled Gatorade.    In order to prevent infection, you will need to purchase Hibiclens soap and use it after your regular body soap for 5 days prior to your procedure.  The last shower should be the night before surgery.  This soap can be found at any pharmacy in the first aid section. See detailed instructions below.    Our office will get prior authorization for surgery through your insurance.   Surgery is usually scheduled as 1-2 day admission.  This is an estimate and varies from person to person.  Ultimately, the surgeon will determine when you are ready to be discharged.  The hospital will contact you 1-2 days before surgery with your arrival time.       If you require FMLA or disability paperwork for your recovery, please make sure to either drop it off or have it faxed to our office at [phone]. Be sure the form has your name and date of birth on it.  The form will be faxed to our Forms Department and they will complete it for you.  There is a 25$ fee for all forms that need to be filled out.  Please be aware there is a 10-14 day turnaround time.  You will need to sign a release of information (VALERIE) form if your paperwork does not come with one.  You may call the Forms Department with any questions at [phone].  Their fax number is [phone].     POST OP CARE--First Two Weeks  No bending at waist, twisting, pushing or pulling  No lifting more than 10 lb (a gallon of milk)  Wear cervical collar/lumbar brace, if prescribed, as instructed by surgeon  No overhead reaching  No driving until approved by your surgeon   Change positions frequently. No prolonged sitting or standing.  Increase walking as tolerated to avoid blood clots  No NSAIDs until approved by surgeon (ibuprofen, aleve, advil, meloxicam, Celebrex, diclofenac etc).   Remove any bandage on post op day 3.  Leave incision open to air.  Glue will fall off on its own.  If you have staples or sutures that are not dissolvable,  these will be removed at your 2 or 3 week post op visit.   Check your incision for signs of infection daily (redness, warmth, drainage, increased pain at incision site, fever)  Do not shower until post op day 3.  Do not allow water pressure to directly hit the incision.  Do not scrub the incision and avoid any lotion, creams or perfume near the incision site.  No swimming, hot tubs or baths until your incision is completely healed.  Take pain medication as prescribed, if needed.  Constipation is a common side effect of opioids.  If you experience constipation, drink plenty of water and take over-the-counter stool softeners daily.   Avoid nicotine and alcohol   When to call the office:  Increased or change in location of pain  Increased weakness in arms or legs  Incision drainage, redness or warmth  Fever  Bowel or bladder changes   Choking on food or liquids (cervical)  Pain, redness, swelling, color changes or warmth in lower leg    Hibiclens Bathing  Hibiclens is a body soap that is used before surgery to protect you from getting an infection post-operatively  Hibiclens comes in a large blue bottle and can be found in most pharmacies in the First Aid supplies  Shower with this daily for FIVE consecutive days before surgery, using the entire bottle over the five days.  The last shower should be the night before surgery.   Steps to bathing with Hibiclens  Do not use Hibiclens on your hair, face or private areas  Wash your hair and body as normal with your usual cleansers  Rinse well  Using a clean wet washcloth apply enough Hibiclens to cover your body. Wash from the neck down avoiding the genital areas and concentrating on the surgical area  Rinse well  Dry yourself with a clean, dry towel  Do not use any powders, creams, lotions or sprays on your body as these attract bacteria  Deodorant, hand lotion and facial creams are acceptable.     For Office Use Only:    Medical Clearances Needed: PCP  PA: PARTH  CPT Codes:  37806, 02123

## 2024-01-18 DIAGNOSIS — M54.16 LUMBAR RADICULOPATHY: ICD-10-CM

## 2024-01-18 NOTE — TELEPHONE ENCOUNTER
Refill Request    Medication request: HYDROcodone-acetaminophen 7.5-325 MG Oral Tab Take 1 tablet by mouth every 4 (four) hours as needed for Pain.     LOV:12/11/2023 Sandeep Anderson MD   Due back to clinic per last office note:  \" follow up in 2-3 months \"  NOV: 3/13/2024 Sandeep Anderson MD      [place]: 12/18/23 #30 - 5 day supply    Urine drug screen (if applicable): none  Pain contract: none    LOV plan (if weaning or changing medications): No mention of any medication at LOV.

## 2024-01-18 NOTE — TELEPHONE ENCOUNTER
Started with insurance earl Barrera [phone] on 1/15/2024.    Prior Authorization for outpatient surgery initiated with [employer] .  Requesting coverage for:MINIMALLY INVASIVE LUMBAR 5 TO SACRAL 1 BILATERAL LAMINOFORAMINOTOMIES APPROACHING FROM THE LEFT, MICRODISCECTOMY   Date of Service: 2/6/2024   Inpatient days requested:outpatient  CPT codes: 58607,63315    Request for surgery pending review, pending reference #[id number]. Clinical notes submitted by fax #[phone], confirmation received.

## 2024-01-19 RX ORDER — HYDROCODONE BITARTRATE AND ACETAMINOPHEN 7.5; 325 MG/1; MG/1
1 TABLET ORAL EVERY 4 HOURS PRN
Qty: 30 TABLET | Refills: 0 | Status: SHIPPED | OUTPATIENT
Start: 2024-01-19

## 2024-01-22 ENCOUNTER — NURSE ONLY (OUTPATIENT)
Dept: SURGERY | Facility: CLINIC | Age: 49
End: 2024-01-22
Payer: COMMERCIAL

## 2024-01-22 ENCOUNTER — PATIENT MESSAGE (OUTPATIENT)
Dept: SURGERY | Facility: CLINIC | Age: 49
End: 2024-01-22

## 2024-01-22 NOTE — TELEPHONE ENCOUNTER
From: Cher Goldman  To: Roberto Carlos Dominique  Sent: 1/22/2024 12:16 PM CST  Subject: Medical clearance     Hello.  I just received my results for labs, and the MRSA came back positive. My MD sent meds to my pharmacy which I will start taking tonight. Once I'm done with rhe antibiotic do I need another test or anything else as a follow up. My PCP said that I am cleared after I'm done with the meds.  Thank you

## 2024-01-22 NOTE — TELEPHONE ENCOUNTER
Pt states she came back positive for MRSA and was prescribed antibiotics.  Sent  msg confirming it was mupirocin nasal ointment.

## 2024-01-22 NOTE — PROGRESS NOTES
RN Spine Navigator Education for Cher     If you have received instructions from your physician that are different from those listed below, please follow your physician's instructions.    Patient is scheduled for a Lumbar decompression with Dr. Dominique on 2/6/2024. Patient attended spine navigator visit independently.     Patient Surgical Goals: Walking without pain    Before Your Surgery    Choose a Care Partner  Care partner is Jono. Your care partner should be able to provide transportation to and from the hospital. They should be able to help at home for the first week after discharge, including helping you with meals, medication, and dressing changes.    Prehab and Clearance  Patient has been to physiatry .  You will need to see your primary care provider within 30 days before surgery. Please make sure this appointment is at least a week before surgery as more testing or doctor visits may be ordered. Presurgical testing may include labs, nasal swab, imaging, EKG.   Make sure that you complete all preadmission testing so that surgery does not get delayed.    Home Environment  Assessed home status: home has stairs and bathroom and bedroom are upstairs. It is recommended that you prepare your home by putting clean sheets on bed, freezing meals, and putting frequently used items at waist level. Prevent falls by removing items that could cause you to trip, adding nightlights and adding a nonskid mat in shower.   Assistive devices can be purchased at a medical supply store or online including canes, walkers, toileting devices, long handled sponge, shower chair or tub transfer bench, reacher, sock aid, long handled shoehorn, if needed.    Smoking Cessation and Marijuana Use   Not applicable    Medications to Stop   Do not take any blood thinning medications such as non-steroidal anti-inflammatories (Advil, Aleve, Motrin, ibuprofen, naproxen, meloxicam, diclofenac, celecoxib, etc.), aspirin (unless told otherwise by  your cardiologist), herbal supplements (garlic, turmeric etc.), vitamin E, fish oil or krill oil for at least 7-10 days prior to surgery. You may only take Tylenol or prescribed narcotic medication if needed for pain. Other medications to stop include: none    Leading Up to Day of Surgery  Five days before surgery wash with Hibiclens soap after your regular body soap every day. Do not put use Hibiclens on your face, hair or privates. Your last shower should be the night before surgery. and use mupirocin (Bactroban) if prescribed.   One-two business days before surgery, the preadmission testing staff will call you and let you know what time to arrive, where to park, and will provide any additional instructions.   Do not eat or drink anything including water, gum, or candies after 11pm the day before surgery, except for Tylenol and Gatorade.   Drink 12 ounces of regular Gatorade (NOT RED) 12 hours prior to your scheduled surgery time. Drink your second 12 ounces of regular Gatorade and take 1000 mg of Tylenol (acetaminophen) 4 hours before your scheduled surgery time.     Items to Bring to the Hospital   Bring insurance card, ID, advanced directive, or medical power of  paperwork, loose fitting clothing, closed-toed shoes with a back and a rubber sole that can accommodate swollen feet, long phone charging cord. Do not bring jewelry, valuables, or medications.   If you take an uncommon medication that the hospital may not have, it must be brought to the hospital in the original container and you must notify the nurse of this medication.     In the Hospital     Operative Day and Hospital Stay  In the preoperative area, you will change into a gown, have an IV placed in your hand or arm by the nurse, and sign any consent paperwork that is needed for your procedure. You will speak to the surgeon and anesthesiologist. It is important to tell the doctors and nurses if you have had any significant side effects from  pain medications or anesthesia such as a rash or severe nausea.    In the operating room, the anesthesiologist will attach monitors, give you oxygen through a mask, and give you medicine through the IV to fall asleep. After you are sleeping, the breathing tube will be placed. You will wake up on the stretcher or hospital bed.  The doctor may use neuromonitoring during your surgery. This is to make sure that the muscles and nerves in your arms and legs are working normally as he operates. The equipment will be hooked up and removed while you are asleep.      During the surgery, your care partner can sit in the surgical waiting area. There are TV screens in that area that keep them informed on your progress.    In the recovery room, monitors will be attached that check your heart rate, blood pressure and oxygen levels. Medications for pain and nausea will be given if you need them. While you may not remember this part, a nurse is with you and constantly checking on your condition. You may have a oakes catheter to empty your bladder or a drain at your surgical site. Your family is not allowed in the recovery room. When you are ready to leave the recovery room, you will be transported on your bed or stretcher to the inpatient unit accompanied by your family once a room is available.  On the inpatient unit, a team of doctors and advanced practice providers will manage your care. The spine care nurses will check your blood pressure, temperature, heartbeat, breathing, stomach sounds and your incision. Medications that are given in the hospital include antibiotics, IV fluids, pain medications, muscle relaxers, stool softeners, and your home medications. You may get blood drawn and another x-ray. Other members of your inpatient care team such as physical and occupational therapists will come to your room to teach you how to move around safely after surgery.     Post Op Plan   The average length of hospital stay is one to  three days. A  may visit you to help arrange extra care for you once you leave the hospital. Occasionally, it is recommended that a home health nurse or therapist visit you in your home for a short time. The best place to recover is your own home, but if you need more assistance than home health and your care partner can provide, the  will help you and your family choose other facilities to help you recover your strength.    Preventing surgical complications  Blood clots: do ankle pumps, walk and wear SCDs  Infection: complete presurgical Hibiclens baths, wash your hands, don't touch your incision  Pneumonia: take deep breaths and cough or use the incentive spirometer right after surgery   Nausea/vomiting: start with liquids and small meals and do not take pills on an empty stomach  Constipation: drink water and walk frequently  Tell your nurse if you are experiencing nausea, vomiting or constipation as they have medications to help treat these.     Additional surgical complications  Other risks include side effects from anesthesia, nerve damage (sensory loss, paralysis, bowel or bladder incontinence), spinal fluid leak, bleeding, hoarseness/swallowing problems, unimproved or increased symptoms, eye injury, or stroke.    At home     Understanding Pain After Surgery  We will do our best to manage your pain after surgery, but it is not possible to be completely pain-free. There will be operative pain in your back or neck. Pain in the arms or legs may be one of the first things to improve. Numbness, weakness, and tingling should improve over time as your nerves heal, however, there can be a temporary increase in symptoms in the first few days due to inflammation.  Pain medications will be prescribed to take home at discharge. We encourage you to use the medication prescribed to you after your surgery, but please take the lowest possible dose to manage your pain. Taking high doses of narcotics  can cause side effects. Transition to plain Tylenol when your pain improves. You may get more continuous pain relief by alternating between medications if you have multiple instead of taking them at the same time (Example: Narcotic at 9am, muscle relaxer at 12pm, narcotic at 3pm, muscle relaxer at 6pm). Write down when you have taken a medication as it may be difficult to remember after a few doses.    Post operative medication   Tylenol (acetaminophen): take for pain. Do not take more than 4000mg per day because it can damage your liver.   Narcotics: take for moderate to severe pain. Do not drink alcohol or drive while taking narcotics. Some narcotic medications (Norco, Tylenol #3, Percocet, Vicodin) contain Tylenol (acetaminophen). Make sure to not exceed the maximum dose if you are taking additional Tylenol with these medications.  Muscle relaxers: take for muscle cramping. These can cause drowsiness.    NSAIDS (Advil, Aleve, Motrin, ibuprofen, naproxen, meloxicam, diclofenac, celecoxib, etc.) or aspirin: Do not take these unless your physician says it is ok.  Stool softeners: take to prevent constipation while you are taking narcotic medications. You can get these over the counter at the pharmacy. You may use laxatives, which are stronger, if needed.    Request a refill through your pharmacy or through the refill request in [facility] (log in, go to medications, then select refill request) at least two business days before you run out of medication if you believe you will need more.    Nonpharmacologic pain management   You may use ice on your incision for 20 minutes every hour to help with pain and swelling. Do not place ice directly on your skin. You can use heat to sooth your muscle but avoid placing heat directly on your incision. Make frequent position changes. You can do gentle stretching while avoiding significant bending or twisting. Use deep breathing techniques and distractions such as TV, music, reading,  or games.     Movement restrictions  After surgery, no bending, lifting, or twisting. Do not lift anything over 10lbs (about the weight of a gallon of milk)  or lift anything over your shoulders. Avoid pulling or pushing. You may use stairs while holding the handrail.  It is ok to ride in the car but refrain from driving or traveling long distances until cleared to do so by your physician. You may not drive while taking narcotics or muscle relaxants. Wear brace as instructed.     Post Op Exercise   Walk frequently using a walker if needed. Start with walking short distances and increase as you start to feel better. Do ankle pumps (bringing your feet towards your head then point them towards the ground) 15-20 times every hour when awake to help prevent blood clots.     Your surgeon will let you know at your post operative appointment if you are ready to decrease your movement restrictions and increase your exercise. You and your doctor will discuss how you are feeling as you heal and decide if outpatient physical therapy or a medical fitness referral is needed.    Caring for your incision  Always wash your hands before touching your incision. Your incision will be closed with sutures under the skin and skin glue or Steri-Strips (thin white bandages) on top of the skin. Do not attempt to peal off Skin glue/Steri-Strips as will come off on their own. The incision may be covered with a dressing that can come off after three days unless saturated with drainage before that. We prefer that you leave your incision open to air, but if it has drainage, you may place gauze and medical tape over it. Change the gauze and medical tape daily. Look at your wound daily to check for signs of infection.   You can shower three days after your surgery. We recommend the care partner be present during the first shower for safety. Let soapy water run over the incision, but do not scrub it or spray it directly with the water from the  shower. Gently pat it dry after with a clean towel. Do not apply any creams or lotions to the incision. Do not soak in a tub, pool, or any body of water until your incision is fully healed.    Signs of Infection   Check your incision daily for swelling, redness, drainage, pus, foul smell, or opening of the incision.     When to Call for Assistance  Call the Neurosurgery Office ([phone] Option #2) if you experience signs of infection, opening of the incision, continuous nausea or vomiting, poor pain control despite using the pain medication as directed, a sudden increase in pain, temperature over 101F, difficulty swallowing, leg swelling, or with any concerns, unanswered questions, or new problems, such as new numbness/weakness/tingling.  Call 911 or go to the nearest emergency room if you experience chest pain, difficulty breathing, loss of bowel or bladder control, extreme drowsiness, or any other life-threatening situation.     Follow-up Plan   Appointments with Dr. Dominique or Chalino at 2, 4 and 12 weeks    Answered questions regarding: Preop exercise walking is ok exercise.      You can contact the RN Spine Navigator at [phone] or [email or web address] if additional questions arise regarding care. Please do not call the RN spine navigator for refills or for emergencies.    Spine navigator spent 25 minutes conducting a virtual visit to provide education. Thank you for letting the RN Spine Navigator participate in your care.

## 2024-01-23 NOTE — TELEPHONE ENCOUNTER
Patient sent  msg stating she was positive for MRSA/MSSA and her PCP prescribed 5 days of mupirocin.      Received fax from Dr Gonzalez of pre-op clearance office note and labs results.  PCP states \"EKG NSR no signs of ischemia, labs WNL, nasal swab positive for MRSA antibiotics sent to pharm.  She is optimized for surgery\"      notes are also viewable in care everywhere.      PCP clearance received.  PA still pending.

## 2024-01-29 ENCOUNTER — OFFICE VISIT (OUTPATIENT)
Dept: SURGERY | Facility: CLINIC | Age: 49
End: 2024-01-29
Payer: COMMERCIAL

## 2024-01-29 VITALS
SYSTOLIC BLOOD PRESSURE: 131 MMHG | BODY MASS INDEX: 28.13 KG/M2 | HEART RATE: 81 BPM | WEIGHT: 175 LBS | HEIGHT: 66 IN | DIASTOLIC BLOOD PRESSURE: 93 MMHG

## 2024-01-29 DIAGNOSIS — M51.9 LUMBAR DISC DISEASE: ICD-10-CM

## 2024-01-29 DIAGNOSIS — M51.16 LUMBAR DISC HERNIATION WITH RADICULOPATHY: ICD-10-CM

## 2024-01-29 DIAGNOSIS — M54.16 LUMBAR RADICULOPATHY: Primary | ICD-10-CM

## 2024-01-29 DIAGNOSIS — M47.816 LUMBAR SPONDYLOSIS: ICD-10-CM

## 2024-01-29 RX ORDER — PRUCALOPRIDE 2 MG/1
TABLET, FILM COATED ORAL
COMMUNITY

## 2024-01-29 NOTE — PROGRESS NOTES
[facility]  Neurological Surgery Established Patient Clinic Note    Cher Goldman  9/23/1975  [id number]  PCP: Meg Gonzalez DO  Referring Provider: Sandeep Anderson MD     REASON FOR VISIT:  Lumbar disc herniation with radiculopathy    HISTORY OF PRESENT ILLNESS:  Cher Goldman is a(n) 48 year old female with no significant past medical history who is referred for evaluation of low back pain with radiation.  She is a very healthy and active woman who has been dealing with some spinal issues for some time.  She has been dealing with some back pain for almost a year, but with a recent exacerbation of her pain.  She has been followed by Dr. Anderson for management of her back pain.  She most recently has developed severe bilateral buttock pain, and pain involving her saddle area as.  She has done physical therapy, she also does activity on her own, she has tried anti-inflammatory medications, neuromodulating agents, and a couple of epidurals steroid injections without success.  She had an MRI of her lumbar spine on 12/8/2023, which demonstrated a very large L5-S1 disc herniation.  She reports chronic constipation, as well as symptoms of pelvic floor dysfunction for which she has had therapy.  She otherwise denies any other red flags.     INTERVAL HISTORY 1/29/2024:  Cher Goldman presents today for a pre-operative visit to discuss upcoming minimally invasive lumbar 5 to sacral 1 bilateral laminoforaminotomies approaching from the left, microdiscectomy surgery on 02/06/2024. Patient states that her lower back pain progresses as the day goes by. She rates her lower back pain about 3-4 out of 10. She states that her pain is radiating to the back of her right thigh and groin.      PAST MEDICAL HISTORY:  Past Medical History:   Diagnosis Date    Migraines 2006    Under control with neurologist     PAST SURGICAL HISTORY:  Past Surgical History:   Procedure Laterality Date    D & C  01/01/2000     D & C  01/01/2007    TUBAL LIGATION  2019     FAMILY HISTORY:  family history includes Arrhythmia in her mother; Diabetes in her maternal grandmother and mother; Heart Attack in her mother; Heart Disease in her father and mother; Hypertension in her father and mother; Ovarian Cancer in her maternal grandmother; Stroke in her father and mother; Throat Cancer in her father and paternal grandfather.    SOCIAL HISTORY:   reports that she has never smoked. She has never used smokeless tobacco. She reports that she does not drink alcohol and does not use drugs.    ALLERGIES:  No Known Allergies  MEDICATIONS:  Current Outpatient Medications on File Prior to Visit   Medication Sig Dispense Refill    mupirocin 2 % External Ointment APPLY TOPICALLY IN EACH NOSTRIL TWICE DAILY FOR 5 DAYS      MOTEGRITY 2 MG Oral Tab       HYDROcodone-acetaminophen 7.5-325 MG Oral Tab Take 1 tablet by mouth every 4 (four) hours as needed for Pain. 30 tablet 0    nortriptyline 10 MG Oral Cap Take 4 capsules (40 mg total) by mouth nightly.      SUMAtriptan Succinate 100 MG Oral Tab 1 po prn migraine, may repeat x 1 in 2 hours.  NTE 2 tabs in 24 hours.       No current facility-administered medications on file prior to visit.     REVIEW OF SYSTEMS:  All other systems were reviewed and were negative except for those previously mentioned in the HPI    PHYSICAL EXAMINATION:  General: No acute distress.  Respiratory: Non-labored respirations bilaterally. No audible wheezing  Cardiovascular: Extremities warm and well-perfused.  Abdomen: Soft, nontender, nondistended.   Musculoskeletal: Moves all extremities well, symmetrically.  Extremities: No edema.     NEUROLOGIC EXAMINATION:  Mental status: Alert and oriented x 3  Speech: Clear, fluent  Cranial nerves: PERRLA, EOMI, face symmetric, with normal strength and sensation, tongue and palate midline, SCM 5/5 bilaterally  Motor:                           RIGHT  Delt 5/5   Bic 5/5  Tri 5/5   HI 5/5     5/5  IP 5/5   Quad 5/5   Ham 5/5   AT 5/5   EHL 5/5 Rebecca 5/5                          LEFT    Delt 5/5   Bic 5/5  Tri 5/5   HI 5/5    5/5  IP 5/5   Quad 5/5   Ham 5/5   AT 5/5   EHL 5/5 Rebecca 4+/5            No pronator drift  Tone: Normal  Atrophy/Fasciculations: None  Sensation: Normal to light touch, symmetric, no neglect  Cerebellar: Normal finger nose finger  Gait: Normal, nondistressed heel toe tandem gait      Reflexes: 2+ throughout, symmetric, no Neymar's     IMAGING:  No new neurosurgical imaging for review    ASSESSMENT:  Ms. Goldman remains with signs and symptoms of lower sacral polyradiculopathies. These symptoms are referable to her imaging findings demonstrating a large L5-S1 disc herniation with compression of the lower sacral nerve roots.  She has some slight left plantarflexion weakness on examination.  Surprisingly, most of her pain remains localized to the saddle areas, consistent with the expected dermatomal distribution based on her imaging.  At this point in time, she has attempted aggressive medical management including physical therapy, additional physical activity on her own, anti-inflammatory medications, neuromodulating agents, as well as a couple of epidural steroid injections.  Given the circumstances, I favor surgical decompression via minimally invasive L5-S1 bilateral laminoforaminotomies, discectomy, approaching from the left.  This is scheduled for 2/6/2024.    Plan:  -Minimally invasive L5-S1 bilateral laminoforaminotomies for discectomy, approaching from the left on 2/6/2024  -Will need LSO brace postop  -Will need PT eval in PACU prior to discharge to work on stairs    Roberto Carlos Dominique MD  Neurological Surgery    [address], [address]  [place] [address]  [phone]  Pager [phone]  1/29/2024 4:28 PM      This note was created using a voice-recognition transcribing system. Incorrect words or phrases may  have been missed during proofreading. Please interpret accordingly.    Total Time    Established Patient Total Time       30  minutes.      Activities       Preparing to see the patient (chart/tests/imaging review).       Obtaining and/or reviewing separately obtained history.       Performing a medically appropriate examination and/or evaluation.       Counseling and educating the patient/family/caregiver.       Ordering medications, tests, or procedures.       Referring and communicating with other health care professionals (when not separately reported).       Documenting clincal information in the electronic or other health record.       Independently interpreting results (not separately reported).    Communicating results to the patient/family/caregiver.    Care coordination (not separately reported).

## 2024-01-29 NOTE — PROGRESS NOTES
Patient presents today for a pre-operative visit to discuss upcoming minimally invasive lumbar 5 to sacral 1 bilateral laminoforaminotomies approaching from the left, microdiscectomy surgery on 02/06/2024. Patient states that her lower back pain progresses as the day goes by. She rates her lower back pain about 3-4 out of 10. She states that her pain is radiating to the back of her right thigh and groin.

## 2024-01-29 NOTE — H&P (VIEW-ONLY)
[facility]  Neurological Surgery Established Patient Clinic Note    Cher Goldman  9/23/1975  [id number]  PCP: Meg Gonzalez DO  Referring Provider: Sandeep Anderson MD     REASON FOR VISIT:  Lumbar disc herniation with radiculopathy    HISTORY OF PRESENT ILLNESS:  Cher Goldman is a(n) 48 year old female with no significant past medical history who is referred for evaluation of low back pain with radiation.  She is a very healthy and active woman who has been dealing with some spinal issues for some time.  She has been dealing with some back pain for almost a year, but with a recent exacerbation of her pain.  She has been followed by Dr. Anderson for management of her back pain.  She most recently has developed severe bilateral buttock pain, and pain involving her saddle area as.  She has done physical therapy, she also does activity on her own, she has tried anti-inflammatory medications, neuromodulating agents, and a couple of epidurals steroid injections without success.  She had an MRI of her lumbar spine on 12/8/2023, which demonstrated a very large L5-S1 disc herniation.  She reports chronic constipation, as well as symptoms of pelvic floor dysfunction for which she has had therapy.  She otherwise denies any other red flags.     INTERVAL HISTORY 1/29/2024:  Cher Goldman presents today for a pre-operative visit to discuss upcoming minimally invasive lumbar 5 to sacral 1 bilateral laminoforaminotomies approaching from the left, microdiscectomy surgery on 02/06/2024. Patient states that her lower back pain progresses as the day goes by. She rates her lower back pain about 3-4 out of 10. She states that her pain is radiating to the back of her right thigh and groin.      PAST MEDICAL HISTORY:  Past Medical History:   Diagnosis Date    Migraines 2006    Under control with neurologist     PAST SURGICAL HISTORY:  Past Surgical History:   Procedure Laterality Date    D & C  01/01/2000     D & C  01/01/2007    TUBAL LIGATION  2019     FAMILY HISTORY:  family history includes Arrhythmia in her mother; Diabetes in her maternal grandmother and mother; Heart Attack in her mother; Heart Disease in her father and mother; Hypertension in her father and mother; Ovarian Cancer in her maternal grandmother; Stroke in her father and mother; Throat Cancer in her father and paternal grandfather.    SOCIAL HISTORY:   reports that she has never smoked. She has never used smokeless tobacco. She reports that she does not drink alcohol and does not use drugs.    ALLERGIES:  No Known Allergies  MEDICATIONS:  Current Outpatient Medications on File Prior to Visit   Medication Sig Dispense Refill    mupirocin 2 % External Ointment APPLY TOPICALLY IN EACH NOSTRIL TWICE DAILY FOR 5 DAYS      MOTEGRITY 2 MG Oral Tab       HYDROcodone-acetaminophen 7.5-325 MG Oral Tab Take 1 tablet by mouth every 4 (four) hours as needed for Pain. 30 tablet 0    nortriptyline 10 MG Oral Cap Take 4 capsules (40 mg total) by mouth nightly.      SUMAtriptan Succinate 100 MG Oral Tab 1 po prn migraine, may repeat x 1 in 2 hours.  NTE 2 tabs in 24 hours.       No current facility-administered medications on file prior to visit.     REVIEW OF SYSTEMS:  All other systems were reviewed and were negative except for those previously mentioned in the HPI    PHYSICAL EXAMINATION:  General: No acute distress.  Respiratory: Non-labored respirations bilaterally. No audible wheezing  Cardiovascular: Extremities warm and well-perfused.  Abdomen: Soft, nontender, nondistended.   Musculoskeletal: Moves all extremities well, symmetrically.  Extremities: No edema.     NEUROLOGIC EXAMINATION:  Mental status: Alert and oriented x 3  Speech: Clear, fluent  Cranial nerves: PERRLA, EOMI, face symmetric, with normal strength and sensation, tongue and palate midline, SCM 5/5 bilaterally  Motor:                           RIGHT  Delt 5/5   Bic 5/5  Tri 5/5   HI 5/5     5/5  IP 5/5   Quad 5/5   Ham 5/5   AT 5/5   EHL 5/5 Rebecca 5/5                          LEFT    Delt 5/5   Bic 5/5  Tri 5/5   HI 5/5    5/5  IP 5/5   Quad 5/5   Ham 5/5   AT 5/5   EHL 5/5 Rebecca 4+/5            No pronator drift  Tone: Normal  Atrophy/Fasciculations: None  Sensation: Normal to light touch, symmetric, no neglect  Cerebellar: Normal finger nose finger  Gait: Normal, nondistressed heel toe tandem gait      Reflexes: 2+ throughout, symmetric, no Neymar's     IMAGING:  No new neurosurgical imaging for review    ASSESSMENT:  Ms. Goldman remains with signs and symptoms of lower sacral polyradiculopathies. These symptoms are referable to her imaging findings demonstrating a large L5-S1 disc herniation with compression of the lower sacral nerve roots.  She has some slight left plantarflexion weakness on examination.  Surprisingly, most of her pain remains localized to the saddle areas, consistent with the expected dermatomal distribution based on her imaging.  At this point in time, she has attempted aggressive medical management including physical therapy, additional physical activity on her own, anti-inflammatory medications, neuromodulating agents, as well as a couple of epidural steroid injections.  Given the circumstances, I favor surgical decompression via minimally invasive L5-S1 bilateral laminoforaminotomies, discectomy, approaching from the left.  This is scheduled for 2/6/2024.    Plan:  -Minimally invasive L5-S1 bilateral laminoforaminotomies for discectomy, approaching from the left on 2/6/2024  -Will need LSO brace postop  -Will need PT eval in PACU prior to discharge to work on stairs    Roberto Carlos Dominique MD  Neurological Surgery    [address], [address]  [place] [address]  [phone]  Pager [phone]  1/29/2024 4:28 PM      This note was created using a voice-recognition transcribing system. Incorrect words or phrases may  have been missed during proofreading. Please interpret accordingly.    Total Time    Established Patient Total Time       30  minutes.      Activities       Preparing to see the patient (chart/tests/imaging review).       Obtaining and/or reviewing separately obtained history.       Performing a medically appropriate examination and/or evaluation.       Counseling and educating the patient/family/caregiver.       Ordering medications, tests, or procedures.       Referring and communicating with other health care professionals (when not separately reported).       Documenting clincal information in the electronic or other health record.       Independently interpreting results (not separately reported).    Communicating results to the patient/family/caregiver.    Care coordination (not separately reported).

## 2024-02-01 NOTE — TELEPHONE ENCOUNTER
Called [facility] [phone] and spoke with Sadaf.    Prior authorization request completed for:  MINIMALLY INVASIVE LUMBAR 5 TO SACRAL 1 BILATERAL LAMINOFORAMINOTOMIES APPROACHING FROM THE LEFT, MICRODISCECTOMY    Authorization #[id number]  Authorization dates: 2/6/2024 (1/16/24-2/16/24)  CPT codes approved: 42376,25561  Number of visits/dates of service approved: outpatient  Physician: Dr Dominique  Location: [facility]

## 2024-02-03 ENCOUNTER — LAB ENCOUNTER (OUTPATIENT)
Dept: LAB | Facility: HOSPITAL | Age: 49
End: 2024-02-03
Attending: STUDENT IN AN ORGANIZED HEALTH CARE EDUCATION/TRAINING PROGRAM
Payer: COMMERCIAL

## 2024-02-03 DIAGNOSIS — Z01.818 PREOP TESTING: ICD-10-CM

## 2024-02-03 LAB
ANTIBODY SCREEN: NEGATIVE
RH BLOOD TYPE: NEGATIVE
RH BLOOD TYPE: NEGATIVE

## 2024-02-03 PROCEDURE — 86850 RBC ANTIBODY SCREEN: CPT

## 2024-02-03 PROCEDURE — 86901 BLOOD TYPING SEROLOGIC RH(D): CPT

## 2024-02-03 PROCEDURE — 36415 COLL VENOUS BLD VENIPUNCTURE: CPT

## 2024-02-03 PROCEDURE — 86900 BLOOD TYPING SEROLOGIC ABO: CPT

## 2024-02-06 ENCOUNTER — ANESTHESIA EVENT (OUTPATIENT)
Dept: SURGERY | Facility: HOSPITAL | Age: 49
End: 2024-02-06
Payer: COMMERCIAL

## 2024-02-06 ENCOUNTER — APPOINTMENT (OUTPATIENT)
Dept: GENERAL RADIOLOGY | Facility: HOSPITAL | Age: 49
End: 2024-02-06
Attending: STUDENT IN AN ORGANIZED HEALTH CARE EDUCATION/TRAINING PROGRAM
Payer: COMMERCIAL

## 2024-02-06 ENCOUNTER — HOSPITAL ENCOUNTER (OUTPATIENT)
Facility: HOSPITAL | Age: 49
Discharge: HOME | End: 2024-02-07
Attending: STUDENT IN AN ORGANIZED HEALTH CARE EDUCATION/TRAINING PROGRAM | Admitting: STUDENT IN AN ORGANIZED HEALTH CARE EDUCATION/TRAINING PROGRAM
Payer: COMMERCIAL

## 2024-02-06 ENCOUNTER — ANESTHESIA (OUTPATIENT)
Dept: SURGERY | Facility: HOSPITAL | Age: 49
End: 2024-02-06
Payer: COMMERCIAL

## 2024-02-06 DIAGNOSIS — M54.16 LUMBAR RADICULOPATHY: ICD-10-CM

## 2024-02-06 DIAGNOSIS — Z98.890 STATUS POST LUMBAR SPINE SURGERY FOR DECOMPRESSION OF SPINAL CORD: ICD-10-CM

## 2024-02-06 DIAGNOSIS — M54.16 LUMBAR RADICULITIS: ICD-10-CM

## 2024-02-06 DIAGNOSIS — Z01.818 PREOP TESTING: Primary | ICD-10-CM

## 2024-02-06 DIAGNOSIS — M51.9 LUMBAR DISC DISEASE: ICD-10-CM

## 2024-02-06 DIAGNOSIS — M51.16 LUMBAR DISC HERNIATION WITH RADICULOPATHY: ICD-10-CM

## 2024-02-06 DIAGNOSIS — M47.816 LUMBAR SPONDYLOSIS: ICD-10-CM

## 2024-02-06 DIAGNOSIS — Z98.890 S/P LUMBAR MICRODISCECTOMY: ICD-10-CM

## 2024-02-06 DIAGNOSIS — M48.062 SPINAL STENOSIS OF LUMBAR REGION WITH NEUROGENIC CLAUDICATION: ICD-10-CM

## 2024-02-06 LAB — B-HCG UR QL: NEGATIVE

## 2024-02-06 PROCEDURE — 0SB40ZZ EXCISION OF LUMBOSACRAL DISC, OPEN APPROACH: ICD-10-PCS | Performed by: STUDENT IN AN ORGANIZED HEALTH CARE EDUCATION/TRAINING PROGRAM

## 2024-02-06 PROCEDURE — 01NB0ZZ RELEASE LUMBAR NERVE, OPEN APPROACH: ICD-10-PCS | Performed by: STUDENT IN AN ORGANIZED HEALTH CARE EDUCATION/TRAINING PROGRAM

## 2024-02-06 PROCEDURE — 73560 X-RAY EXAM OF KNEE 1 OR 2: CPT | Performed by: STUDENT IN AN ORGANIZED HEALTH CARE EDUCATION/TRAINING PROGRAM

## 2024-02-06 PROCEDURE — 99204 OFFICE O/P NEW MOD 45 MIN: CPT | Performed by: HOSPITALIST

## 2024-02-06 PROCEDURE — 76000 FLUOROSCOPY <1 HR PHYS/QHP: CPT | Performed by: STUDENT IN AN ORGANIZED HEALTH CARE EDUCATION/TRAINING PROGRAM

## 2024-02-06 RX ORDER — MIDAZOLAM HYDROCHLORIDE 1 MG/ML
INJECTION INTRAMUSCULAR; INTRAVENOUS AS NEEDED
Status: DISCONTINUED | OUTPATIENT
Start: 2024-02-06 | End: 2024-02-06 | Stop reason: SURG

## 2024-02-06 RX ORDER — SODIUM CHLORIDE, SODIUM LACTATE, POTASSIUM CHLORIDE, CALCIUM CHLORIDE 600; 310; 30; 20 MG/100ML; MG/100ML; MG/100ML; MG/100ML
INJECTION, SOLUTION INTRAVENOUS CONTINUOUS
Status: DISCONTINUED | OUTPATIENT
Start: 2024-02-06 | End: 2024-02-07

## 2024-02-06 RX ORDER — ACETAMINOPHEN 500 MG
1000 TABLET ORAL ONCE
Status: DISCONTINUED | OUTPATIENT
Start: 2024-02-06 | End: 2024-02-06 | Stop reason: HOSPADM

## 2024-02-06 RX ORDER — ONDANSETRON 2 MG/ML
4 INJECTION INTRAMUSCULAR; INTRAVENOUS EVERY 6 HOURS PRN
Status: DISCONTINUED | OUTPATIENT
Start: 2024-02-06 | End: 2024-02-07

## 2024-02-06 RX ORDER — NALOXONE HYDROCHLORIDE 0.4 MG/ML
0.08 INJECTION, SOLUTION INTRAMUSCULAR; INTRAVENOUS; SUBCUTANEOUS AS NEEDED
Status: DISCONTINUED | OUTPATIENT
Start: 2024-02-06 | End: 2024-02-06 | Stop reason: HOSPADM

## 2024-02-06 RX ORDER — MORPHINE SULFATE 10 MG/ML
6 INJECTION, SOLUTION INTRAMUSCULAR; INTRAVENOUS EVERY 10 MIN PRN
Status: DISCONTINUED | OUTPATIENT
Start: 2024-02-06 | End: 2024-02-06 | Stop reason: HOSPADM

## 2024-02-06 RX ORDER — GLYCOPYRROLATE 0.2 MG/ML
INJECTION, SOLUTION INTRAMUSCULAR; INTRAVENOUS AS NEEDED
Status: DISCONTINUED | OUTPATIENT
Start: 2024-02-06 | End: 2024-02-06 | Stop reason: SURG

## 2024-02-06 RX ORDER — PROCHLORPERAZINE EDISYLATE 5 MG/ML
5 INJECTION INTRAMUSCULAR; INTRAVENOUS EVERY 8 HOURS PRN
Status: DISCONTINUED | OUTPATIENT
Start: 2024-02-06 | End: 2024-02-07

## 2024-02-06 RX ORDER — VANCOMYCIN HYDROCHLORIDE
15 ONCE
Status: COMPLETED | OUTPATIENT
Start: 2024-02-06 | End: 2024-02-06

## 2024-02-06 RX ORDER — DEXAMETHASONE SODIUM PHOSPHATE 4 MG/ML
VIAL (ML) INJECTION AS NEEDED
Status: DISCONTINUED | OUTPATIENT
Start: 2024-02-06 | End: 2024-02-06 | Stop reason: SURG

## 2024-02-06 RX ORDER — OXYCODONE HYDROCHLORIDE 5 MG/1
5 TABLET ORAL EVERY 4 HOURS PRN
Status: DISCONTINUED | OUTPATIENT
Start: 2024-02-06 | End: 2024-02-07

## 2024-02-06 RX ORDER — PROCHLORPERAZINE EDISYLATE 5 MG/ML
5 INJECTION INTRAMUSCULAR; INTRAVENOUS EVERY 8 HOURS PRN
Status: DISCONTINUED | OUTPATIENT
Start: 2024-02-06 | End: 2024-02-06 | Stop reason: HOSPADM

## 2024-02-06 RX ORDER — NALOXONE HYDROCHLORIDE 4 MG/.1ML
4 SPRAY NASAL AS NEEDED
Qty: 1 KIT | Refills: 0 | Status: SHIPPED | OUTPATIENT
Start: 2024-02-06 | End: 2024-02-19 | Stop reason: ALTCHOICE

## 2024-02-06 RX ORDER — ACETAMINOPHEN 500 MG
500 TABLET ORAL EVERY 4 HOURS PRN
Qty: 120 TABLET | Refills: 0 | Status: SHIPPED | OUTPATIENT
Start: 2024-02-06

## 2024-02-06 RX ORDER — METHOCARBAMOL 500 MG/1
500 TABLET, FILM COATED ORAL EVERY 6 HOURS
Status: DISCONTINUED | OUTPATIENT
Start: 2024-02-07 | End: 2024-02-07

## 2024-02-06 RX ORDER — LIDOCAINE HYDROCHLORIDE 10 MG/ML
INJECTION, SOLUTION EPIDURAL; INFILTRATION; INTRACAUDAL; PERINEURAL AS NEEDED
Status: DISCONTINUED | OUTPATIENT
Start: 2024-02-06 | End: 2024-02-06 | Stop reason: SURG

## 2024-02-06 RX ORDER — OXYCODONE HYDROCHLORIDE 5 MG/1
5 TABLET ORAL ONCE AS NEEDED
Status: COMPLETED | OUTPATIENT
Start: 2024-02-06 | End: 2024-02-06

## 2024-02-06 RX ORDER — DOCUSATE SODIUM 100 MG/1
100 CAPSULE, LIQUID FILLED ORAL 2 TIMES DAILY
Status: DISCONTINUED | OUTPATIENT
Start: 2024-02-06 | End: 2024-02-07

## 2024-02-06 RX ORDER — HYDROMORPHONE HYDROCHLORIDE 1 MG/ML
0.8 INJECTION, SOLUTION INTRAMUSCULAR; INTRAVENOUS; SUBCUTANEOUS EVERY 2 HOUR PRN
Status: DISCONTINUED | OUTPATIENT
Start: 2024-02-06 | End: 2024-02-07

## 2024-02-06 RX ORDER — SENNOSIDES 8.6 MG
17.2 TABLET ORAL NIGHTLY
Status: DISCONTINUED | OUTPATIENT
Start: 2024-02-06 | End: 2024-02-07

## 2024-02-06 RX ORDER — NORTRIPTYLINE HYDROCHLORIDE 10 MG/1
40 CAPSULE ORAL NIGHTLY
Status: DISCONTINUED | OUTPATIENT
Start: 2024-02-06 | End: 2024-02-07

## 2024-02-06 RX ORDER — CEFAZOLIN SODIUM/WATER 2 G/20 ML
2 SYRINGE (ML) INTRAVENOUS ONCE
Status: DISCONTINUED | OUTPATIENT
Start: 2024-02-06 | End: 2024-02-06 | Stop reason: HOSPADM

## 2024-02-06 RX ORDER — ACETAMINOPHEN 10 MG/ML
1000 INJECTION, SOLUTION INTRAVENOUS EVERY 6 HOURS
Status: DISCONTINUED | OUTPATIENT
Start: 2024-02-06 | End: 2024-02-07

## 2024-02-06 RX ORDER — DIPHENHYDRAMINE HYDROCHLORIDE 50 MG/ML
25 INJECTION INTRAMUSCULAR; INTRAVENOUS EVERY 4 HOURS PRN
Status: DISCONTINUED | OUTPATIENT
Start: 2024-02-06 | End: 2024-02-07

## 2024-02-06 RX ORDER — METHOCARBAMOL 500 MG/1
500 TABLET, FILM COATED ORAL 3 TIMES DAILY PRN
Qty: 60 TABLET | Refills: 0 | Status: SHIPPED | OUTPATIENT
Start: 2024-02-06

## 2024-02-06 RX ORDER — DIPHENHYDRAMINE HCL 25 MG
25 CAPSULE ORAL EVERY 4 HOURS PRN
Status: DISCONTINUED | OUTPATIENT
Start: 2024-02-06 | End: 2024-02-07

## 2024-02-06 RX ORDER — OXYCODONE HYDROCHLORIDE 5 MG/1
5 TABLET ORAL EVERY 4 HOURS PRN
Qty: 30 TABLET | Refills: 0 | Status: SHIPPED | OUTPATIENT
Start: 2024-02-06

## 2024-02-06 RX ORDER — ENOXAPARIN SODIUM 100 MG/ML
40 INJECTION SUBCUTANEOUS DAILY
Status: DISCONTINUED | OUTPATIENT
Start: 2024-02-07 | End: 2024-02-07

## 2024-02-06 RX ORDER — HYDROMORPHONE HYDROCHLORIDE 1 MG/ML
0.6 INJECTION, SOLUTION INTRAMUSCULAR; INTRAVENOUS; SUBCUTANEOUS EVERY 5 MIN PRN
Status: DISCONTINUED | OUTPATIENT
Start: 2024-02-06 | End: 2024-02-06 | Stop reason: HOSPADM

## 2024-02-06 RX ORDER — HYDROMORPHONE HYDROCHLORIDE 1 MG/ML
0.2 INJECTION, SOLUTION INTRAMUSCULAR; INTRAVENOUS; SUBCUTANEOUS EVERY 5 MIN PRN
Status: DISCONTINUED | OUTPATIENT
Start: 2024-02-06 | End: 2024-02-06 | Stop reason: HOSPADM

## 2024-02-06 RX ORDER — METHOCARBAMOL 500 MG/1
500 TABLET, FILM COATED ORAL ONCE AS NEEDED
Status: COMPLETED | OUTPATIENT
Start: 2024-02-06 | End: 2024-02-06

## 2024-02-06 RX ORDER — HYDROMORPHONE HYDROCHLORIDE 1 MG/ML
0.4 INJECTION, SOLUTION INTRAMUSCULAR; INTRAVENOUS; SUBCUTANEOUS EVERY 2 HOUR PRN
Status: DISCONTINUED | OUTPATIENT
Start: 2024-02-06 | End: 2024-02-07

## 2024-02-06 RX ORDER — ONDANSETRON 2 MG/ML
4 INJECTION INTRAMUSCULAR; INTRAVENOUS EVERY 6 HOURS PRN
Status: DISCONTINUED | OUTPATIENT
Start: 2024-02-06 | End: 2024-02-06 | Stop reason: HOSPADM

## 2024-02-06 RX ORDER — NEOSTIGMINE METHYLSULFATE 1 MG/ML
INJECTION, SOLUTION INTRAVENOUS AS NEEDED
Status: DISCONTINUED | OUTPATIENT
Start: 2024-02-06 | End: 2024-02-06 | Stop reason: SURG

## 2024-02-06 RX ORDER — PHENYLEPHRINE HCL 10 MG/ML
VIAL (ML) INJECTION AS NEEDED
Status: DISCONTINUED | OUTPATIENT
Start: 2024-02-06 | End: 2024-02-06 | Stop reason: SURG

## 2024-02-06 RX ORDER — ENEMA 19; 7 G/133ML; G/133ML
1 ENEMA RECTAL ONCE AS NEEDED
Status: DISCONTINUED | OUTPATIENT
Start: 2024-02-06 | End: 2024-02-07

## 2024-02-06 RX ORDER — OXYCODONE HYDROCHLORIDE 5 MG/1
10 TABLET ORAL EVERY 4 HOURS PRN
Status: DISCONTINUED | OUTPATIENT
Start: 2024-02-06 | End: 2024-02-07

## 2024-02-06 RX ORDER — MORPHINE SULFATE 4 MG/ML
2 INJECTION, SOLUTION INTRAMUSCULAR; INTRAVENOUS EVERY 10 MIN PRN
Status: DISCONTINUED | OUTPATIENT
Start: 2024-02-06 | End: 2024-02-06 | Stop reason: HOSPADM

## 2024-02-06 RX ORDER — POLYETHYLENE GLYCOL 3350 17 G/17G
17 POWDER, FOR SOLUTION ORAL DAILY PRN
Status: DISCONTINUED | OUTPATIENT
Start: 2024-02-06 | End: 2024-02-07

## 2024-02-06 RX ORDER — DEXAMETHASONE SODIUM PHOSPHATE 4 MG/ML
4 VIAL (ML) INJECTION EVERY 6 HOURS
Status: DISCONTINUED | OUTPATIENT
Start: 2024-02-06 | End: 2024-02-07

## 2024-02-06 RX ORDER — BISACODYL 10 MG
10 SUPPOSITORY, RECTAL RECTAL
Status: DISCONTINUED | OUTPATIENT
Start: 2024-02-06 | End: 2024-02-07

## 2024-02-06 RX ORDER — ONDANSETRON 2 MG/ML
INJECTION INTRAMUSCULAR; INTRAVENOUS AS NEEDED
Status: DISCONTINUED | OUTPATIENT
Start: 2024-02-06 | End: 2024-02-06 | Stop reason: SURG

## 2024-02-06 RX ORDER — MORPHINE SULFATE 4 MG/ML
4 INJECTION, SOLUTION INTRAMUSCULAR; INTRAVENOUS EVERY 10 MIN PRN
Status: DISCONTINUED | OUTPATIENT
Start: 2024-02-06 | End: 2024-02-06 | Stop reason: HOSPADM

## 2024-02-06 RX ORDER — ROCURONIUM BROMIDE 10 MG/ML
INJECTION, SOLUTION INTRAVENOUS AS NEEDED
Status: DISCONTINUED | OUTPATIENT
Start: 2024-02-06 | End: 2024-02-06 | Stop reason: SURG

## 2024-02-06 RX ORDER — HYDROMORPHONE HYDROCHLORIDE 1 MG/ML
0.4 INJECTION, SOLUTION INTRAMUSCULAR; INTRAVENOUS; SUBCUTANEOUS EVERY 5 MIN PRN
Status: DISCONTINUED | OUTPATIENT
Start: 2024-02-06 | End: 2024-02-06 | Stop reason: HOSPADM

## 2024-02-06 RX ADMIN — ONDANSETRON 4 MG: 2 INJECTION INTRAMUSCULAR; INTRAVENOUS at 11:44:00

## 2024-02-06 RX ADMIN — SODIUM CHLORIDE, SODIUM LACTATE, POTASSIUM CHLORIDE, CALCIUM CHLORIDE: 600; 310; 30; 20 INJECTION, SOLUTION INTRAVENOUS at 10:10:00

## 2024-02-06 RX ADMIN — ROCURONIUM BROMIDE 30 MG: 10 INJECTION, SOLUTION INTRAVENOUS at 10:22:00

## 2024-02-06 RX ADMIN — GLYCOPYRROLATE 0.2 MG: 0.2 INJECTION, SOLUTION INTRAMUSCULAR; INTRAVENOUS at 11:55:00

## 2024-02-06 RX ADMIN — ROCURONIUM BROMIDE 10 MG: 10 INJECTION, SOLUTION INTRAVENOUS at 10:16:00

## 2024-02-06 RX ADMIN — LIDOCAINE HYDROCHLORIDE 50 MG: 10 INJECTION, SOLUTION EPIDURAL; INFILTRATION; INTRACAUDAL; PERINEURAL at 10:16:00

## 2024-02-06 RX ADMIN — NEOSTIGMINE METHYLSULFATE 3 MG: 1 INJECTION, SOLUTION INTRAVENOUS at 11:55:00

## 2024-02-06 RX ADMIN — DEXAMETHASONE SODIUM PHOSPHATE 4 MG: 4 MG/ML VIAL (ML) INJECTION at 10:22:00

## 2024-02-06 RX ADMIN — SODIUM CHLORIDE, SODIUM LACTATE, POTASSIUM CHLORIDE, CALCIUM CHLORIDE: 600; 310; 30; 20 INJECTION, SOLUTION INTRAVENOUS at 11:20:00

## 2024-02-06 RX ADMIN — MIDAZOLAM HYDROCHLORIDE 2 MG: 1 INJECTION INTRAMUSCULAR; INTRAVENOUS at 10:11:00

## 2024-02-06 RX ADMIN — SODIUM CHLORIDE, SODIUM LACTATE, POTASSIUM CHLORIDE, CALCIUM CHLORIDE: 600; 310; 30; 20 INJECTION, SOLUTION INTRAVENOUS at 11:53:00

## 2024-02-06 RX ADMIN — ROCURONIUM BROMIDE 10 MG: 10 INJECTION, SOLUTION INTRAVENOUS at 11:27:00

## 2024-02-06 RX ADMIN — ROCURONIUM BROMIDE 10 MG: 10 INJECTION, SOLUTION INTRAVENOUS at 10:38:00

## 2024-02-06 RX ADMIN — PHENYLEPHRINE HCL 100 MCG: 10 MG/ML VIAL (ML) INJECTION at 11:44:00

## 2024-02-06 RX ADMIN — PHENYLEPHRINE HCL 100 MCG: 10 MG/ML VIAL (ML) INJECTION at 11:38:00

## 2024-02-06 RX ADMIN — ROCURONIUM BROMIDE 10 MG: 10 INJECTION, SOLUTION INTRAVENOUS at 11:17:00

## 2024-02-06 RX ADMIN — NEOSTIGMINE METHYLSULFATE 1 MG: 1 INJECTION, SOLUTION INTRAVENOUS at 11:57:00

## 2024-02-06 NOTE — ANESTHESIA PROCEDURE NOTES
Airway  Date/Time: 2/6/2024 10:19 AM  Urgency: Elective    Airway not difficult    General Information and Staff    Patient location during procedure: OR  Anesthesiologist: Cristian Yepez MD  Resident/CRNA: Delio Meyer CRNA  Performed: CRNA   Performed by: Delio Meyer CRNA  Authorized by: Cristian Yepez MD      Indications and Patient Condition  Indications for airway management: anesthesia  Spontaneous Ventilation: absent  Sedation level: deep  Preoxygenated: yes  Patient position: sniffing  Mask difficulty assessment: 1 - vent by mask    Final Airway Details  Final airway type: endotracheal airway      Successful airway: ETT  Cuffed: yes   Successful intubation technique: direct laryngoscopy  Facilitating devices/methods: intubating stylet and tooth guard  Endotracheal tube insertion site: oral  Blade: Juanito  Blade size: #3  ETT size (mm): 7.0    Placement verified by: capnometry   Cuff volume (mL): 9  Measured from: teeth  ETT to teeth (cm): 21  Number of attempts at approach: 1

## 2024-02-06 NOTE — OPERATIVE REPORT
[facility]  Neurosurgery Operative Note         Cher Goldman Location: OR   [facility] [id number] MRN [id number]   Admission Date 2/6/2024 Operation Date 2/6/2024   Attending Physician Roberto Carlos Dominique MD       Patient Name: Cher Goldman     Date of surgery:  2/6/2024    Surgeon:  Roberto Carlos Dominique MD     Assistant:  MARIE Crane (given the procedure's level of complexity, the help of an abled surgical assistant was necessary to ensure patient safety and preservation of critical structures)  Chalino Umanzor PA-C    Preoperative diagnosis:  Lumbalgia  Lumbar spondylosis  Lumbar disc herniation with radiculopathy (L5-S1 with bilateral S1 radiculopathies)     Postoperative diagnosis:  Lumbalgia  Lumbar spondylosis  Lumbar disc herniation with radiculopathy (L5-S1 with bilateral S1 radiculopathies)  Status post L5-S1 discectomy     Procedure performed:  Minimally invasive bilateral L5-S1 laminoforaminotomies, partial medial facetectomies, lateral recess decompressions and discectomy.  Use of intraoperative fluoroscopy including interpretation of x-rays  Use of operating microscope     Indications for procedure:  Ms. Goldman is a 48-year-old female who was referred to my clinic with complaints of low back and bilateral right greater than left leg pain that had been unrelenting, refractory to nonoperative treatment including epidural steroid injections. She was neurologically intact on exam and her imaging studies demonstrated a central disc protrusion at L5-S1 that impinged on the bilateral nerve roots. Given the circumstances, I felt surgical treatment of her pathology was indicated, could prove beneficial, and thus recommended it to her. Prior to surgery and during preoperative counseling, the risks, benefits, and potential outcomes of the surgical intervention were outlined to Ms. Goldman using language she could comprehend. She expressed her understanding and elected to proceed.      Procedure in  detail:  The patient was identified and taken to the operating room. She was placed under general endotracheal anesthesia without complication. She was positioned prone on a Steven frame, which laid on top of a flat Miguel table. Her shoulders were abducted and arms flexed to 90 degrees. All pressure points were appropriately padded. She was firmly secured to the operating table. She received the appropriate preoperative antibiotic prophylaxis. Sequential compression devices were maintained on the lower extremities for DVT prophylaxis. The lumbar region was identified. It was prepped and draped in sterile fashion. An appropriate time out was performed following standard protocol. Under fluoroscopic guidance, a spinal needle was used to localize the L5-S1 interspace and accordingly plan the surgical incision.    A 2 cm incision was made 1.5 cm to the left of midline overlying the L5-S1 interspace.  Using a guidewire, a series of sequential dilators and tubular retractor, access was gained to the inferior aspect of the left L5 lamina, the medial aspect of the left L5-S1 facet joint and left L5-S1 interlaminar space. Intraoperative fluoroscopy was used to confirm adequate positioning of the tubular apparatus after which the operating microscope was brought in for better visualization. Under microscopic magnification, soft tissue dissection was performed exposing the aforementioned landmarks. A high-speed drill was then used to perform a left inferior laminotomy of L5. The bony resection was taken across midline undercutting the spinous process until the contralateral lateral recess was identified. The bony resection was taken superiorly until the ligamentum flavum could be detached from the undersurface of the L5 lamina. The ligamentum flavum was elevated and resected, exposing the dura beneath. Bilateral lateral recess decompressions were then performed using a series of rongeurs and curettes as well as the high  speed drill. Safe exposure to the nerve roots necessitated partial medial facetectomies. This was done until the lateral border of the dura and traversing S1 nerve roots were identified and freed dorsally along its course within the subarticular compartment to the level of the S1 pedicles until we found them to be satisfactorily decompressed along its ventral and dorsal aspects. We retracted the lateral border of the dura and the left S1 nerve root medially exposing the subannular disc protrusion at L5-S1. We coagulated the overlying epidural vasculature, incised the annulus and removed multiple herniated disc fragments piecemeal until the posterior aspect of the L5-S1 disc was flush with the posterior aspect of the L5 and S1 vertebral bodies. Afterwards, we achieved satisfactory hemostasis and again tracked the path of the bilateral S1 nerve roots within the subarticular compartment to the level of the bilateral pedicles, found it to be satisfactorily decompressed along its ventral and dorsal aspects. We again achieved adequate hemostasis, irrigated the surgical field and placed 40 mg of Depo-Medrol into the epidural space overlying the bilateral L5-S1 lateral recesses and the bilateral S1 nerve roots. We then pulled out the tubular retractor, ensuring to coagulate any actively bleeding muscle tissue on the way out. The surgical incision was then closed in multiple layers and reapproximated at the skin with 4-0 Monocryl in a subcuticular fashion. The patient was then allowed to emerge from general anesthesia and was subsequently extubated. She was taken to the recovery room for further convalescence.    Anesthesia: General endotracheal.    Estimated blood loss: 5 mL.    Counts: All needle, sponge, and cottonoid counts were reported correct at the end of the operation.     Complications: None.     Drains: None.     Implants: None.    Specimen:   ID Type Source Tests Collected by Time Destination   1 : Lumbar 5 -  Sacral 1 Disc Tissue Lumbar disc SURGICAL PATHOLOGY TISSUE Roberto aCrlos Dominique MD 2/6/2024 11:43 AM       Wound classification: 1, clean.    Disposition: Home to self care.     Condition: Stable, neurologically at baseline.    Roberto Carlos Dominique MD  Neurological Surgery    [address], [address] [place] [address]  [phone]  Pager [phone]  2/6/2024 11:56 AM      This note was created using a voice-recognition transcribing system. Incorrect words or phrases may have been missed during proofreading. Please interpret accordingly.

## 2024-02-06 NOTE — BRIEF OP NOTE
Pre-Operative Diagnosis: Lumbar radiculopathy [M54.16]  Lumbar disc herniation with radiculopathy [M51.16]  Lumbar disc disease [M51.9]  Lumbar spondylosis [M47.816]     Post-Operative Diagnosis: Lumbar radiculopathy [M54.16]Lumbar disc herniation with radiculopathy [M51.16]Lumbar disc disease [M51.9]Lumbar spondylosis [M47.816]      Procedure Performed:   Minimally invasive lumbar 5 to sacral 1 bilateral laminoforaminotomies approaching from the left, microdiscectomy    Surgeon(s) and Role:     * Roberto Carlos Dominique MD - Primary    Assistant(s):  Surgical Assistant.: Rico Bustamante  PA: Chalino Umanzor PA-C     Surgical Findings: Large L5-S1 disc herniation compressing the bilateral S1 nerve roots     Specimen: L5-S1 disc herniated fragments sent for pathologic examination     Estimated Blood Loss: 5 mL    Roberto Carlos Dominique MD  Neurological Surgery    [address], [place] [id number]  [phone]  Pager [phone]  2/6/2024 11:54 AM      This note was created using a voice-recognition transcribing system. Incorrect words or phrases may have been missed during proofreading. Please interpret accordingly.

## 2024-02-06 NOTE — ANESTHESIA POSTPROCEDURE EVALUATION
Patient: Cher Goldman    Procedure Summary       Date: 02/06/24 Room / Location: [facility] MAIN OR 02 / [facility] MAIN OR    Anesthesia Start: 1010 Anesthesia Stop: 1211    Procedure: Minimally invasive lumbar 5 to sacral 1 bilateral laminoforaminotomies approaching from the left, microdiscectomy (Left: Spine Lumbar) Diagnosis:       Lumbar radiculopathy      Lumbar disc herniation with radiculopathy      Lumbar disc disease      Lumbar spondylosis      (Lumbar radiculopathy [M54.16]Lumbar disc herniation with radiculopathy [M51.16]Lumbar disc disease [M51.9]Lumbar spondylosis [M47.816])    Surgeons: Roberto Carlos Dominique MD Anesthesiologist: Cristian Yepez MD    Anesthesia Type: general ASA Status: 2            Anesthesia Type: general    Vitals Value Taken Time   /79 02/06/24 1210   Temp 97.2 °F (36.2 °C) 02/06/24 1210   Pulse 75 02/06/24 1210   Resp 15 02/06/24 1210   SpO2 100 % 02/06/24 1210   Vitals shown include unfiled device data.    [facility] AN Post Evaluation:   Patient Evaluated in PACU  Patient Participation: complete - patient participated  Level of Consciousness: awake and alert  Pain Score: 0  Pain Management: adequate  Airway Patency:patent  Dental exam unchanged from preop  Yes    Cardiovascular Status: hemodynamically stable  Respiratory Status: acceptable and nasal cannula  Postoperative Hydration acceptable    Delio Meyer CRNA  2/6/2024 12:11 PM

## 2024-02-06 NOTE — INTERVAL H&P NOTE
Pre-op Diagnosis: Lumbar radiculopathy [M54.16]  Lumbar disc herniation with radiculopathy [M51.16]  Lumbar disc disease [M51.9]  Lumbar spondylosis [M47.816]    The above referenced H&P was reviewed by Roberto Carlos Dominique MD on 2/6/2024, the patient was examined and no significant changes have occurred in the patient's condition since the H&P was performed.  I discussed with the patient and/or legal representative the potential benefits, risks and side effects of this procedure; the likelihood of the patient achieving goals; and potential problems that might occur during recuperation.  I discussed reasonable alternatives to the procedure, including risks, benefits and side effects related to the alternatives and risks related to not receiving this procedure.  I went over the risks and associated outcomes of surgery with Ms. Goldman using terms she could comprehend. The risks discussed included but were not limited to neurologic injury (spinal cord or nerve root injury), infection (ranging from superficial wound infection to lumbar vertebral osteomyelitis), intraoperative bleeding from blood vessel injury (which could lead to a stroke or death from exsanguination), a postoperative hemorrhage (which could lead to neurologic compromise), a dural tear (which could lead to a pseudomeningocele or a CSF fistula despite intraoperative repair), substantial blood loss (which could require a transfusion), acute myocardial infarction, venous thromboembolism, pneumonia, urinary tract infection or death from complications related to anesthesia. Ms. Goldman expressed her understanding of the above and has elected to proceed with surgery which is scheduled for today, 2/6/2024.     Roberto Carlos Dominique MD  Neurological Surgery    [address], [address] [place] [address]  [phone]  Pager [phone]  2/6/2024 8:53 AM      This note was created using a voice-recognition  transcribing system. Incorrect words or phrases may have been missed during proofreading. Please interpret accordingly.

## 2024-02-06 NOTE — DISCHARGE INSTRUCTIONS
[facility] FOR COMFORT ONLY      Minimally Invasive Lumbar Microdiscectomy/Laminectomy Discharge Instructions  (Dr. Dominique)    Activity  Restrictions  No twisting, pulling, pushing or lifting > 10 lbs for the first month,  > 20 lbs for the second month, > 30 lbs for the third month.  No lifting anything over your head.    Sitting  Sit with your knees at about the same level as your hips; use a footstool if needed.  Do not sit in soft or overstuffed chairs. Firm chairs with straight backs give better support.   Recliners are OK but may need to add pillows in order to not sink into the chair.  Use a raised toilet seat if needed.  Change position every 20-30 minutes when sitting (example: after sitting, stand, then you can sit again for another 20-30 minutes).    Walking is your best exercise.  Listen to your body.  Walk several times a day  Smaller distances, but frequently are better.  Your goal is to increase the distance you walk each day.  Remember to listen to your body    Sex  After 2 weeks, when comfortable and approved by your surgeon.  Stop if causing pain.    Driving  Usually allowed after 1-2 weeks; so long as you're not under the influence of narcotics (check with physician at first office visit).  Do Not drive while taking narcotics or muscle relaxants.  Adhere to sitting restrictions.    Stairs  Climb stairs as needed    Incision site care and dressing  Changes as directed by your surgeon    YOUR INCISION IS CLOSED WITH ABSORBABLE SUTURES AND SKIN GLUE  May leave open to air. A clean 4x4 gauze may prevent clothing from rubbing incision.   Watch incision for any redness, drainage, increased warmth or opening of the incision. Call surgeon if you notice any of these.  No lotions or ointments on or near incision.  Always wash hands before and after touching incision.    Bathing  No tub baths, pools, or saunas for 6 weeks and until cleared by surgeon.  When able to shower, you may have water run down over the  incision but do not scrub or pick off the glue.  After showering, pat incision dry and may apply and allow for incision to dry thoroughly before covering it.  Do not apply any lotions or ointments to incision.     Return to work  When cleared by surgeon. Discuss specific work activities with your surgeon at follow up visit.  Light to sedentary work may be possible 2-3 weeks post op  Heavy work may be possible 6 weeks post op.    Sleeping  Use a firm mattress.  Lay on side or back, not stomach.  May use pillow under knees, between legs or behind back if lying on your side.    Diet and constipation prevention  Drink six to eight glasses liquid (water, juice) per day.  Eat a high fiber diet (bran, vegetables, fruit).  Use an over the counter stool softener such as Colace or senakot while taking narcotics to prevent constipation; use laxatives such as Miralax or Milk of Magnesia as needed.  An enema or suppository may be necessary if above measures do not work.    No smoking  Smoking will inhibit healing.  Even one cigarette a day will cause problems.  Chewing tobacco, nicotine gum or patches will also inhibit healing.    Pain Management  Relaxation techniques  A way to focus your attention other than on your pain. Your brain makes endorphins that are a natural body chemical that can decrease pain. Some examples of relaxation techniques are deep breathing, listening to music or meditating.  May use Cold therapy for 20 minutes multiple times per day.  Be sure there is a cloth barrier between skin and cold therapy.  Medication  Tylenol (acetaminophen) and Motrin (ibuprofen) are preferred. Caution if your pain med contains Tylenol (acetaminophen); maximum 3.5 gms of Tylenol (acetaminophen) in 24 hours. You can alternate between Tylenol and Motrin every 4 hrs.  A single aspirin (81 mg) for the heart is ok if ordered by your physician.  Take muscle relaxants and pain medications as prescribed allowing 30-45 minutes to take  effect.  Do NOT take alcohol while on pain medication.  Monitor need for pain medication closely  Call pharmacy or physician office at least five days before out of pain medication. If calling physician office after 3 pm, it will be handled on the next business day.    Post op office visit  Attend your 2 week follow up, already scheduled after surgery with surgeon as directed at discharge  Schedule one week follow up with Primary Care Physician. Review all medications.    When to contact your surgeon  Temp > 101F; Take your temperature twice a day  Increased pain, swelling, redness, or any drainage to incision.  Separation of incision.  Sudden reappearance of pain that won’t go away with pain medication.  New numbness or weakness to arms or legs.  Difficulty urinating or having bowel movements  Headaches that worsen when standing and resolve when laying flat.    Go directly to the ER or CALL 911 if you:  become short of breath  have chest pain  cough up blood  have unexplained anxiety with breathing       HOME INSTRUCTIONS  [facility] HOME CARE INSTRUCTIONS: POST-OP ANESTHESIA  The medication that you received for sedation or general anesthesia can last up to 24 hours. Your judgment and reflexes may be altered, even if you feel like your normal self.      We Recommend:   Do not drive any motor vehicle or bicycle   Avoid mowing the lawn, playing sports, or working with power tools/applicances (power saws, electric knives or mixers)   That you have someone stay with you on your first night home   Do not drink alcohol or take sleeping pills or tranquilizers   Do not sign legal documents within 24 hours of your procedure   If you had a nerve block for your surgery, take extra care not to put any pressure on your arm or hand for 24 hours    It is normal:  For you to have a sore throat if you had a breathing tube during surgery (while you were asleep!). The sore throat should get better within 48 hours. You can gargle with  warm salt water (1/2 tsp in 4 oz warm water) or use a throat lozenge for comfort  To feel muscle aches or soreness especially in the abdomen, chest or neck. The achy feeling should go away in the next 24 hours  To feel weak, sleepy or \"wiped out\". Your should start feeling better in the next 24 hours.   To experience mild discomforts such as sore lip or tongue, headache, cramps, gas pains or a bloated feeling in your abdomen.   To experience mild back pain or soreness for a day or two if you had spinal or epidural anesthesia.   If you had laparoscopic surgery, to feel shoulder pain or discomfort on the day of surgery.   For some patients to have nausea after surgery/anesthesia    If you feel nausea or experience vomiting:   Try to move around less.   Eat less than usual or drink only liquids until the next morning   Nausea should resolve in about 24 hours    If you have a problem when you are at home:    Call your surgeons office

## 2024-02-06 NOTE — ANESTHESIA PREPROCEDURE EVALUATION
Anesthesia PreOp Note    HPI:     Cher Goldman is a 48 year old female who presents for preoperative consultation requested by: Roberto Carlos Dominique MD    Date of Surgery: 2/6/2024    Procedure(s):  Minimally invasive lumbar 5 to sacral 1 bilateral laminoforaminotomies approaching from the left, microdiscectomy  Indication: Lumbar radiculopathy [M54.16]  Lumbar disc herniation with radiculopathy [M51.16]  Lumbar disc disease [M51.9]  Lumbar spondylosis [M47.816]    Relevant Problems   No relevant active problems       NPO:  Last Liquid Consumption Date: 02/05/24  Last Liquid Consumption Time: 1900  Last Solid Consumption Date: 02/05/24  Last Solid Consumption Time: 1900  Last Liquid Consumption Date: 02/05/24          History Review:  Patient Active Problem List    Diagnosis Date Noted    L5-S1 severe central stenosis 12/11/2023    Right lateral epicondylitis 02/10/2022    Cervical disc disease: C5-6 mild 06/04/2021    right C7 radiculopathy 04/29/2021    Generalized joint pain 04/29/2021    Acute right-sided low back pain without sciatica 01/21/2021    Lumbar facet arthropathy 01/21/2021    Lumbar facet joint syndrome: right L5-S1 01/21/2021    Neck pain on right side 05/13/2020    right > left L5-S1 radiculopathy 08/27/2019    L5-S1 large central herniated disc 08/27/2019    L3-4 mild central, L4-5 left paracentral mild-mod bulging discs 08/27/2019       Past Medical History:   Diagnosis Date    Back problem     Migraines 2006    Under control with neurologist       Past Surgical History:   Procedure Laterality Date    D & C  01/01/2000    D & C  01/01/2007    TUBAL LIGATION  2019       Medications Prior to Admission   Medication Sig Dispense Refill Last Dose    MOTEGRITY 2 MG Oral Tab    2/5/2024 at 0800    HYDROcodone-acetaminophen 7.5-325 MG Oral Tab Take 1 tablet by mouth every 4 (four) hours as needed for Pain. 30 tablet 0 2/3/2024    nortriptyline 10 MG Oral Cap Take 4 capsules (40 mg total) by mouth nightly.    2024 at 2100    SUMAtriptan Succinate 100 MG Oral Tab 1 po prn migraine, may repeat x 1 in 2 hours.  NTE 2 tabs in 24 hours.   Past Month    [] HYDROcodone-acetaminophen 5-325 MG Oral Tab Take 1 tablet by mouth every 6 (six) hours as needed for Pain. 20 tablet 0      Current Facility-Administered Medications Ordered in Epic   Medication Dose Route Frequency Provider Last Rate Last Admin    lactated ringers infusion   Intravenous Continuous Roberto Carlos Dominique MD 20 mL/hr at 24 0815 New Bag at 24 0815    acetaminophen (Tylenol Extra Strength) tab 1,000 mg  1,000 mg Oral Once Roberto Carlos Dominique MD        ceFAZolin (Ancef) 2 g in 20mL IV syringe premix  2 g Intravenous Once Roberto Carlos Dominique MD        And    vancomycin (Vancocin) 1.25 g in sodium chloride 0.9% 250mL IVPB premix  15 mg/kg Intravenous Once Roberto Carlos Dominique MD         No current [facility]-ordered outpatient medications on file.       No Known Allergies    Family History   Problem Relation Age of Onset    Heart Disease Father         MI age 60    Other (Throat Cancer [Other]) Father     Hypertension Father     Stroke Father     Heart Disease Mother         MI age 57    Hypertension Mother     Diabetes Mother     Arrhythmia Mother     Heart Attack Mother     Stroke Mother     Other (Throat Cancer [Other]) Paternal Grandfather     Ovarian Cancer Maternal Grandmother     Diabetes Maternal Grandmother      Social History     Socioeconomic History    Marital status:    Occupational History    Occupation: [employer]   Tobacco Use    Smoking status: Never    Smokeless tobacco: Never   Vaping Use    Vaping Use: Never used   Substance and Sexual Activity    Alcohol use: No    Drug use: No    Sexual activity: Yes     Partners: Male     Birth control/protection: OCP     Comment: same parnter   Other Topics Concern    Exercise No       Available pre-op labs reviewed.             Vital Signs:  Body mass index is 29.05 kg/m².    height is 1.676 m (5' 6\") and weight is 81.6 kg (180 lb). Her oral temperature is 97.9 °F (36.6 °C). Her blood pressure is 121/85 and her pulse is 90. Her respiration is 18 and oxygen saturation is 100%.   Vitals:    02/01/24 1515 02/06/24 0812   BP:  121/85   Pulse:  90   Resp:  18   Temp:  97.9 °F (36.6 °C)   TempSrc:  Oral   SpO2:  100%   Weight: 79.4 kg (175 lb) 81.6 kg (180 lb)   Height: 1.676 m (5' 6\") 1.676 m (5' 6\")        Anesthesia Evaluation     Patient summary reviewed and Nursing notes reviewed    No history of anesthetic complications   Airway   Mallampati: II  TM distance: >3 FB  Neck ROM: full  Dental - Dentition appears grossly intact     Pulmonary - negative ROS and normal exam   Cardiovascular - negative ROS and normal exam  Exercise tolerance: good    Neuro/Psych    (+)  neuromuscular disease (lumbar and cervical radiculopathy), headaches (migraines),        GI/Hepatic/Renal - negative ROS     Endo/Other - negative ROS   Abdominal  - normal exam                 Anesthesia Plan:   ASA:  2  Plan:   General  Airway:  ETT  Post-op Pain Management: IV analgesics and Oral pain medication  Informed Consent Plan and Risks Discussed With:  Patient  Discussed plan with:  CRNA      I have informed Cher Goldman of the nature of the anesthetic plan, benefits, risks including possible dental damage if relevant, major complications, and any alternative forms of anesthetic management.   All of the patient's questions were answered to the best of my ability. The patient desires the anesthetic management as planned.  DAVID CASTRO MD  2/6/2024 8:19 AM  Present on Admission:  **None**

## 2024-02-07 VITALS
BODY MASS INDEX: 28.93 KG/M2 | DIASTOLIC BLOOD PRESSURE: 80 MMHG | OXYGEN SATURATION: 100 % | HEART RATE: 84 BPM | TEMPERATURE: 98 F | RESPIRATION RATE: 16 BRPM | WEIGHT: 180 LBS | SYSTOLIC BLOOD PRESSURE: 122 MMHG | HEIGHT: 66 IN

## 2024-02-07 PROCEDURE — 99214 OFFICE O/P EST MOD 30 MIN: CPT | Performed by: INTERNAL MEDICINE

## 2024-02-07 RX ORDER — PREGABALIN 100 MG/1
100 CAPSULE ORAL 2 TIMES DAILY
Qty: 60 CAPSULE | Refills: 1 | Status: SHIPPED | OUTPATIENT
Start: 2024-02-07 | End: 2024-02-19

## 2024-02-07 RX ORDER — PREGABALIN 50 MG/1
100 CAPSULE ORAL 2 TIMES DAILY
Status: DISCONTINUED | OUTPATIENT
Start: 2024-02-07 | End: 2024-02-07

## 2024-02-07 RX ORDER — DEXAMETHASONE 4 MG/1
4 TABLET ORAL 2 TIMES DAILY WITH MEALS
Qty: 3 TABLET | Refills: 0 | Status: SHIPPED | OUTPATIENT
Start: 2024-02-07 | End: 2024-02-19 | Stop reason: ALTCHOICE

## 2024-02-07 RX ORDER — ONDANSETRON 4 MG/1
4 TABLET, ORALLY DISINTEGRATING ORAL EVERY 4 HOURS PRN
Qty: 60 TABLET | Refills: 0 | Status: SHIPPED | OUTPATIENT
Start: 2024-02-07

## 2024-02-07 RX ORDER — DEXAMETHASONE 4 MG/1
4 TABLET ORAL 2 TIMES DAILY WITH MEALS
Status: DISCONTINUED | OUTPATIENT
Start: 2024-02-07 | End: 2024-02-07

## 2024-02-07 NOTE — DISCHARGE SUMMARY
[facility]    Discharge Summary    Cher Goldman Patient Status:  Outpatient in a Bed    1975 MRN [id number]   Location [facility] Attending Roberto Carlos Dominique MD   Hosp Day # 0 PCP Meg oGnzalez DO     Date of Admission: 2024     Date of Discharge: 24      Lace+ Score: 6  59-90 High Risk  29-58 Medium Risk  0-28   Low Risk.    TCM Follow-Up Recommendation:  LACE < 29: Low Risk of readmission after discharge from the hospital. No TCM follow-up needed.    DISCHARGE DX: Active Problems:    S/P lumbar microdiscectomy    Status post lumbar spine surgery for decompression of spinal cord       The patient was seen and examined on day of discharge and this discharge summary is in conjunction with any daily progress note from day of discharge.    HPI per admitting physician: \"Cher Goldman is a(n) 48 year old female with no significant past medical history who is referred for evaluation of low back pain with radiation.  She is a very healthy and active woman who has been dealing with some spinal issues for some time.  She has been dealing with some back pain for almost a year, but with a recent exacerbation of her pain.  She has been followed by Dr. Anderson for management of her back pain.  She most recently has developed severe bilateral buttock pain, and pain involving her saddle area as.  She has done physical therapy, she also does activity on her own, she has tried anti-inflammatory medications, neuromodulating agents, and a couple of epidurals steroid injections without success.  She had an MRI of her lumbar spine on 2023, which demonstrated a very large L5-S1 disc herniation.  She reports chronic constipation, as well as symptoms of pelvic floor dysfunction for which she has had therapy.  She otherwise denies any other red flags. \"    Hospital Course:     Lumbar spondylosis with radiculopathy   - S/P lumbar microdiscectomy   - cont pain control as needed  - Encourage  Incentive spirometry  - PT/OT per Neurosurgery  - F/u Neurosurgery as outpt        Physical Exam:    Vitals:    02/06/24 2043 02/07/24 0008 02/07/24 0616 02/07/24 0750   BP: 141/82 127/86 130/81 122/86   BP Location: Left arm Left arm Left arm Left arm   Pulse: 73 83 76    Resp: 20 16 16 16   Temp:  98.1 °F (36.7 °C)     TempSrc: Oral Oral  Oral   SpO2: 100%  100%    Weight:       Height:         Patient Weight for the past 72 hrs:   Weight   02/06/24 0812 180 lb (81.6 kg)       Intake/Output Summary (Last 24 hours) at 2/7/2024 0829  Last data filed at 2/7/2024 0617  Gross per 24 hour   Intake 700 ml   Output 1605 ml   Net -905 ml         GENERAL:  Awake and alert, in no acute distress.  HEART:  S1 and S2 heard.  RRR   LUNGS:  Air entry was good.  No crackles or wheezes   ABDOMEN: Soft and non-tender.    PSYCHIATRIC: Normal mood    CULTURE:   No results found for this visit on 02/06/24.    IMAGING STUDIES: SOME MAY NEED FOLLOW UP WITH PCP   XR KNEE (1 OR 2 VIEWS), RIGHT (CPT=73560)    Result Date: 2/6/2024  CONCLUSION:  1. No acute fracture or dislocation.  Mild femoral tibial degeneration.    Dictated by (CST): Jones Grover MD on 2/06/2024 at 5:08 PM     Finalized by (CST): Jones Grover MD on 2/06/2024 at 5:08 PM          XR FLUOROSCOPY C-ARM TIME LESS THAN 1 HOUR (CPT=76000)    Result Date: 2/6/2024  CONCLUSION:  1. Fluoroscopic guidance utilized during L5-S1 minimally invasive spinal surgery.    Dictated by (CST): Jones Grover MD on 2/06/2024 at 3:57 PM     Finalized by (CST): Jones Grover MD on 2/06/2024 at 4:00 PM           LABS :     Lab Results   Component Value Date    WBC 7.9 02/01/2016    HGB 13.1 02/01/2016    HCT 39.2 02/01/2016     02/01/2016    CREATSERUM 0.70 02/01/2016    BUN 19 02/01/2016     02/01/2016    K 3.5 02/01/2016     02/01/2016    CO2 23 02/01/2016    GLU 90 02/01/2016    CA 9.1 02/01/2016    ALB 4.3 02/01/2016    BILT 0.6 02/01/2016    TP 7.1  02/01/2016    AST 16 02/01/2016    ALT 13 (L) 02/01/2016    TSH 1.53 02/01/2016       No results for input(s): \"RBC\", \"HGB\", \"HCT\", \"MCV\", \"MCH\", \"MCHC\", \"RDW\", \"NEPRELIM\", \"WBC\", \"PLT\" in the last 168 hours.  No results for input(s): \"GLU\", \"BUN\", \"CREATSERUM\", \"GFRAA\", \"GFRNAA\", \"CA\", \"ALB\", \"NA\", \"K\", \"CL\", \"CO2\", \"ALKPHO\", \"AST\", \"ALT\", \"BILT\", \"TP\" in the last 168 hours.  No results found for: \"PT\", \"INR\"    Disposition: Discharge to Home    Condition at Discharge: Stable     Discharge Medications:      Discharge Medications        START taking these medications        Instructions Prescription details   acetaminophen 500 MG Tabs  Commonly known as: Tylenol Extra Strength      Take 1 tablet (500 mg total) by mouth every 4 (four) hours as needed.   Quantity: 120 tablet  Refills: 0     dexamethasone 4 MG tablet  Commonly known as: Decadron      Take 1 tablet (4 mg total) by mouth 2 (two) times daily with meals.   Quantity: 3 tablet  Refills: 0     methocarbamol 500 MG Tabs  Commonly known as: Robaxin      Take 1 tablet (500 mg total) by mouth 3 (three) times daily as needed.   Quantity: 60 tablet  Refills: 0     Naloxone HCl 4 MG/0.1ML Liqd      4 mg by Nasal route as needed. If patient remains unresponsive, repeat dose in other nostril 2-5 minutes after first dose.   Quantity: 1 kit  Refills: 0     oxyCODONE 5 MG Tabs      Take 1 tablet (5 mg total) by mouth every 4 (four) hours as needed.   Quantity: 30 tablet  Refills: 0     pregabalin 100 MG Caps  Commonly known as: Lyrica      Take 1 capsule (100 mg total) by mouth 2 (two) times daily.   Quantity: 60 capsule  Refills: 1            CONTINUE taking these medications        Instructions Prescription details   Motegrity 2 MG Tabs  Generic drug: Prucalopride Succinate       Refills: 0     nortriptyline 10 MG Caps  Commonly known as: Pamelor      Take 4 capsules (40 mg total) by mouth nightly.   Refills: 0     SUMAtriptan Succinate 100 MG Tabs  Commonly known as:  IMITREX      1 po prn migraine, may repeat x 1 in 2 hours.  NTE 2 tabs in 24 hours.   Refills: 0            STOP taking these medications      HYDROcodone-acetaminophen 5-325 MG Tabs  Commonly known as: Norco        HYDROcodone-acetaminophen 7.5-325 MG Tabs  Commonly known as: Norco                  Where to Get Your Medications        These medications were sent to [id number] - [place], [place] - [address] AT [facility], [phone], [phone] [address], [facility] [address]      Phone: [phone]   acetaminophen 500 MG Tabs  dexamethasone 4 MG tablet  methocarbamol 500 MG Tabs  Naloxone HCl 4 MG/0.1ML Liqd  oxyCODONE 5 MG Tabs  pregabalin 100 MG Caps         Follow up Visits  Chalino Umanzor PA-C  [address] [facility] [address]  [facility] [address] [phone]    Go on 2/19/2024  915am    Meg Gonzalez DO    Consultants         Provider   Role Specialty     Tad Camilo MD  Consulting Physician HOSPITALIST              Other Discharge Instructions:       Discharge Instructions         Minimally Invasive Lumbar Microdiscectomy/Laminectomy Discharge Instructions  (Dr. Dominique)    Activity  Restrictions  No twisting, pulling, pushing or lifting > 10 lbs for the first month,  > 20 lbs for the second month, > 30 lbs for the third month.  No lifting anything over your head.    Sitting  Sit with your knees at about the same level as your hips; use a footstool if needed.  Do not sit in soft or overstuffed chairs. Firm chairs with straight backs give better support.   Recliners are OK but may need to add pillows in order to not sink into the chair.  Use a raised toilet seat if needed.  Change position every 20-30 minutes when sitting (example: after sitting, stand, then you can sit again for another 20-30 minutes).    Walking is your best exercise.  Listen to your body.  Walk several times a day  Smaller distances, but frequently are better.  Your goal is to increase the  distance you walk each day.  Remember to listen to your body    Sex  After 2 weeks, when comfortable and approved by your surgeon.  Stop if causing pain.    Driving  Usually allowed after 1-2 weeks; so long as you're not under the influence of narcotics (check with physician at first office visit).  Do Not drive while taking narcotics or muscle relaxants.  Adhere to sitting restrictions.    Stairs  Climb stairs as needed    Incision site care and dressing  Changes as directed by your surgeon    YOUR INCISION IS CLOSED WITH ABSORBABLE SUTURES AND SKIN GLUE  May leave open to air. A clean 4x4 gauze may prevent clothing from rubbing incision.   Watch incision for any redness, drainage, increased warmth or opening of the incision. Call surgeon if you notice any of these.  No lotions or ointments on or near incision.  Always wash hands before and after touching incision.    Bathing  No tub baths, pools, or saunas for 6 weeks and until cleared by surgeon.  When able to shower, you may have water run down over the incision but do not scrub or pick off the glue.  After showering, pat incision dry and may apply and allow for incision to dry thoroughly before covering it.  Do not apply any lotions or ointments to incision.     Return to work  When cleared by surgeon. Discuss specific work activities with your surgeon at follow up visit.  Light to sedentary work may be possible 2-3 weeks post op  Heavy work may be possible 6 weeks post op.    Sleeping  Use a firm mattress.  Lay on side or back, not stomach.  May use pillow under knees, between legs or behind back if lying on your side.    Diet and constipation prevention  Drink six to eight glasses liquid (water, juice) per day.  Eat a high fiber diet (bran, vegetables, fruit).  Use an over the counter stool softener such as Colace or senakot while taking narcotics to prevent constipation; use laxatives such as Miralax or Milk of Magnesia as needed.  An enema or suppository  may be necessary if above measures do not work.    No smoking  Smoking will inhibit healing.  Even one cigarette a day will cause problems.  Chewing tobacco, nicotine gum or patches will also inhibit healing.    Pain Management  Relaxation techniques  A way to focus your attention other than on your pain. Your brain makes endorphins that are a natural body chemical that can decrease pain. Some examples of relaxation techniques are deep breathing, listening to music or meditating.  May use Cold therapy for 20 minutes multiple times per day.  Be sure there is a cloth barrier between skin and cold therapy.  Medication  Tylenol (acetaminophen) and Motrin (ibuprofen) are preferred. Caution if your pain med contains Tylenol (acetaminophen); maximum 3.5 gms of Tylenol (acetaminophen) in 24 hours. You can alternate between Tylenol and Motrin every 4 hrs.  A single aspirin (81 mg) for the heart is ok if ordered by your physician.  Take muscle relaxants and pain medications as prescribed allowing 30-45 minutes to take effect.  Do NOT take alcohol while on pain medication.  Monitor need for pain medication closely  Call pharmacy or physician office at least five days before out of pain medication. If calling physician office after 3 pm, it will be handled on the next business day.    Post op office visit  Attend your 2 week follow up, already scheduled after surgery with surgeon as directed at discharge  Schedule one week follow up with Primary Care Physician. Review all medications.    When to contact your surgeon  Temp > 101F; Take your temperature twice a day  Increased pain, swelling, redness, or any drainage to incision.  Separation of incision.  Sudden reappearance of pain that won’t go away with pain medication.  New numbness or weakness to arms or legs.  Difficulty urinating or having bowel movements  Headaches that worsen when standing and resolve when laying flat.    Go directly to the ER or CALL 911 if you:  become  short of breath  have chest pain  cough up blood  have unexplained anxiety with breathing       HOME INSTRUCTIONS  AMBSURG HOME CARE INSTRUCTIONS: POST-OP ANESTHESIA  The medication that you received for sedation or general anesthesia can last up to 24 hours. Your judgment and reflexes may be altered, even if you feel like your normal self.      We Recommend:   Do not drive any motor vehicle or bicycle   Avoid mowing the lawn, playing sports, or working with power tools/applicances (power saws, electric knives or mixers)   That you have someone stay with you on your first night home   Do not drink alcohol or take sleeping pills or tranquilizers   Do not sign legal documents within 24 hours of your procedure   If you had a nerve block for your surgery, take extra care not to put any pressure on your arm or hand for 24 hours    It is normal:  For you to have a sore throat if you had a breathing tube during surgery (while you were asleep!). The sore throat should get better within 48 hours. You can gargle with warm salt water (1/2 tsp in 4 oz warm water) or use a throat lozenge for comfort  To feel muscle aches or soreness especially in the abdomen, chest or neck. The achy feeling should go away in the next 24 hours  To feel weak, sleepy or \"wiped out\". Your should start feeling better in the next 24 hours.   To experience mild discomforts such as sore lip or tongue, headache, cramps, gas pains or a bloated feeling in your abdomen.   To experience mild back pain or soreness for a day or two if you had spinal or epidural anesthesia.   If you had laparoscopic surgery, to feel shoulder pain or discomfort on the day of surgery.   For some patients to have nausea after surgery/anesthesia    If you feel nausea or experience vomiting:   Try to move around less.   Eat less than usual or drink only liquids until the next morning   Nausea should resolve in about 24 hours    If you have a problem when you are at home:    Call  your surgeons office         Discharge References/Attachments    After Your Surgery: Discharge Instructions (English)         ----------------------------------------------------  33 MIN SPENT ON THIS DC   Kaiden Goldberg MD    2/7/2024

## 2024-02-07 NOTE — PHYSICAL THERAPY NOTE
PHYSICAL THERAPY EVALUATION - INPATIENT     Room Number: Room 2/Room 2-A  Evaluation Date: 2024  Type of Evaluation: Initial   Physician Order: PT Eval and Treat    Presenting Problem:  (L5-S1 minimally invasive microdiscectomy)     Reason for Therapy: Mobility Dysfunction and Discharge Planning    PHYSICAL THERAPY ASSESSMENT   Patient is a 48 year old female admitted 2024 for L5-S1 microdiscectomy.  Prior to admission, patient's baseline is independent.  Patient is currently functioning near baseline with bed mobility, transfers, gait, and stair negotiation.  Patient is requiring supervision as a result of the following impairments: pain.  Physical Therapy will continue to follow for duration of hospitalization.    Patient will benefit from continued skilled PT Services at discharge to promote functional independence in home.  Anticipate patient will return home with OP PT.    PLAN             PHYSICAL THERAPY MEDICAL/SOCIAL HISTORY   History related to current admission:      Problem List  Active Problems:    S/P lumbar microdiscectomy    Status post lumbar spine surgery for decompression of spinal cord      HOME SITUATION  Home Situation  Type of Home: House (6 exterior stairs)  Home Layout: Two level;Bed/bath upstairs (15 interior stairs)  Lives With: Spouse;Son  Drives: Yes  Patient Regularly Uses: Glasses     Prior Level of [place]:     SUBJECTIVE  \"My outside of my foot feels weird and numb\"    PHYSICAL THERAPY EXAMINATION   OBJECTIVE  Precautions: Spine  Fall Risk: Standard fall risk    WEIGHT BEARING RESTRICTION  Weight Bearing Restriction: None                PAIN ASSESSMENT  Ratin  Location:  (low back)       COGNITION  Overall Cognitive Status:  WFL - within functional limits    RANGE OF MOTION AND STRENGTH ASSESSMENT  Upper extremity ROM and strength are within functional limits   Lower extremity ROM is within functional limits   Lower extremity strength is within functional limits      BALANCE  Static Sitting: Good  Dynamic Sitting: Good  Static Standing: Fair -  Dynamic Standing: Fair -    AM-PAC '6-Clicks' INPATIENT SHORT FORM - BASIC MOBILITY  How much difficulty does the patient currently have...  Patient Difficulty: Turning over in bed (including adjusting bedclothes, sheets and blankets)?: A Little   Patient Difficulty: Sitting down on and standing up from a chair with arms (e.g., wheelchair, bedside commode, etc.): A Little   Patient Difficulty: Moving from lying on back to sitting on the side of the bed?: A Little   How much help from another person does the patient currently need...   Help from Another: Moving to and from a bed to a chair (including a wheelchair)?: A Little   Help from Another: Need to walk in hospital room?: A Little   Help from Another: Climbing 3-5 steps with a railing?: A Little     AM-PAC Score:  Raw Score: 18   Approx Degree of Impairment: 46.58%   Standardized Score (AM-PAC Scale): 43.63   CMS Modifier (G-Code): CK    FUNCTIONAL ABILITY STATUS  Functional Mobility/Gait Assessment  Gait Assistance: Contact guard assist  Distance (ft):  (165)  Assistive Device:  (quad cane)  Pattern:  (step to)  Stairs: Stairs  How Many Stairs:  (8)  Device: 1 Rail  Assist: Modified independent  Pattern: Ascend and Descend  Ascend and Descend : Step to  Rolling: supervision  Supine to Sit: supervision  Sit to Supine: supervision  Sit to Stand: supervision    Exercise/Education Provided:  Lower therapeutic exercise:  Knee extension    The patient's Approx Degree of Impairment: 46.58% has been calculated based on documentation in the [facility] '6 clicks' Inpatient Basic Mobility Short Form.  Research supports that patients with this level of impairment may benefit from home with HH however home with out pt PT follow up is the plan.  Final disposition will be made by interdisciplinary medical team.    Patient End of Session: Up in chair    CURRENT GOALS  Goals to be met by:  2/21  Patient Goal Patient's self-stated goal is: home   Goal #1 Patient is able to demonstrate supine - sit EOB @ level: supervision     Goal #1   Current Status    Goal #2 Patient is able to demonstrate transfers Sit to/from Stand at assistance level: supervision with walker - rolling     Goal #2  Current Status    Goal #3 Patient is able to ambulate 500 feet with assist device: walker - rolling at assistance level: supervision   Goal #3   Current Status    Goal #4 Patient will negotiate 3 stairs/one curb w/ assistive device and supervision   Goal #4   Current Status    Goal #5 Patient to demonstrate independence with home activity/exercise instructions provided to patient in preparation for discharge.   Goal #5   Current Status    Goal #6    Goal #6  Current Status      Patient Evaluation Complexity Level:  History Low - no personal factors and/or co-morbidities   Examination of body systems Low -  addressing 1-2 elements   Clinical Presentation Low- Stable   Clinical Decision Making  Low Complexity     Gait Training: 10 minutes

## 2024-02-07 NOTE — OCCUPATIONAL THERAPY NOTE
OCCUPATIONAL THERAPY EVALUATION - INPATIENT     Room Number: Room 2/Room 2-A  Evaluation Date: 2/7/2024  Type of Evaluation: Initial    Presenting Problem: Minimally invasive lumbar 5 to sacral 1 bilateral laminoforaminotomies approaching from the left, microdiscectomy          Physician Order: IP Consult to Occupational Therapy  Reason for Therapy: ADL/IADL Dysfunction and Discharge Planning    OCCUPATIONAL THERAPY ASSESSMENT   Patient is a 48 year old female admitted 2/6/2024 for Minimally invasive lumbar 5 to sacral 1 bilateral laminoforaminotomies approaching from the left, microdiscectomy   .  Prior to admission, patient's baseline is ind with ADL and mobility, works full-time.  Patient is currently functioning below baseline with toileting, bathing, lower body dressing, transfers, dynamic standing balance, and functional standing tolerance.  Patient is requiring minimal assist as a result of the following impairments: decreased endurance and pain. Occupational Therapy will continue to follow for duration of hospitalization.    Patient will benefit from 1 session of skilled OT Services; however, given the patient has met STG listed below, further skilled therapy is not indicated.    PLAN  OT Treatment Plan: ADL training;Functional transfer training;Endurance training;Patient/Family education;Patient/Family training;Compensatory technique education;Equipment eval/education  OT Device Recommendations: None    OCCUPATIONAL THERAPY MEDICAL/SOCIAL HISTORY   Problem List   Active Problems:    S/P lumbar microdiscectomy    Status post lumbar spine surgery for decompression of spinal cord    HOME SITUATION  Type of Home: House (6 exterior stairs)  Home Layout: Two level; Bed/bath upstairs (15 interior stairs)  Lives With: Spouse; Son  Toilet and Equipment: Standard height toilet  Shower/Tub and Equipment: Tub-shower combo  Occupation/Status: [employer]  Hand Dominance: Right  Drives: Yes  Patient Regularly  Uses: Glasses    Stairs in Home: 15 interior stairs  Assistive Device(s) Used: N/A     Prior Level of [place]: Pt was independent with ADL's, IADL's, functional mobility, functional transfers, negotiating stairs and driving.     SUBJECTIVE  \"I'm going to leave sticky notes all  over my house \"Don't twist\"!\"    OCCUPATIONAL THERAPY EXAMINATION   OBJECTIVE  Fall Risk: Standard fall risk    WEIGHT BEARING RESTRICTION     PAIN ASSESSMENT  Ratin  Location: Back  Management Techniques: Activity promotion; Body mechanics; Breathing techniques; Relaxation; Repositioning; Nurse notified    ACTIVITY TOLERANCE  Pulse: 84  Heart Rate Source: Monitor     BP: 122/80 (MAP 93)  BP Location: Left arm     Patient Position: Sitting    O2 SATURATIONS  Oxygen Therapy  SPO2% on Room Air at Rest: 100    COGNITION  Overall Cognitive Status:  WFL - within functional limits    RANGE OF MOTION   Upper extremity ROM is within functional limits     STRENGTH ASSESSMENT  Upper extremity strength is within functional limits     ACTIVITIES OF DAILY LIVING ASSESSMENT  AM-PAC ‘6-Clicks’ Inpatient Daily Activity Short Form  How much help from another person does the patient currently need…  -   Putting on and taking off regular lower body clothing?: None  -   Bathing (including washing, rinsing, drying)?: None  -   Toileting, which includes using toilet, bedpan or urinal? : None  -   Putting on and taking off regular upper body clothing?: None  -   Taking care of personal grooming such as brushing teeth?: None  -   Eating meals?: None    AM-PAC Score:  Score: 24  Approx Degree of Impairment: 0%  Standardized Score (AM-PAC Scale): 57.54  CMS Modifier (G-Code): CH    FUNCTIONAL TRANSFER ASSESSMENT  Sit to Stand: Chair  Chair: Modified Independent    BED MOBILITY  Rolling: Not Tested (Pt was up in the chair)    BALANCE ASSESSMENT  Static Sitting: Independent  Static Standing: Modified Independent       Skilled Therapy Provided: ADL training,  transfer training, pt and spouse education.    EDUCATION PROVIDED  Patient: Role of Occupational Therapy; Plan of Care; Functional Transfer Techniques; Fall Prevention; Surgical Precautions; Energy Conservation; Compensatory ADL Techniques  Patient's Response to Education: Verbalized Understanding; Returned Demonstration  Family/Caregiver: Role of Occupational Therapy; Plan of Care; Discharge Recommendations  Family/Caregiver's Response to Education: Verbalized Understanding    The patient's Approx Degree of Impairment: 0% has been calculated based on documentation in the [facility] '6 clicks' Inpatient Daily Activity Short Form.  Research supports that patients with this level of impairment may benefit from discharge without service.  Final disposition will be made by interdisciplinary medical team.    Patient End of Session: Up in chair;Needs met;Call light within reach;RN aware of session/findings;All patient questions and concerns addressed;Family present    OT Goals  Patient self-stated goal is: return to work     Patient will complete LE dressing with MI  Comment: 2/ met    Patient will complete toilet transfer with MI  Comment:  met    Patient will complete self care task at sink level with MI   Comment: / met    Patient will independently recall spine precautions  Comment: 2/ met         Goals  on: 24  Frequency: 1 session; DC OT    Patient Evaluation Complexity Level:   Occupational Profile/Medical History LOW - Brief history including review of medical or therapy records    Specific performance deficits impacting engagement in ADL/IADL LOW  1 - 3 performance deficits    Client Assessment/Performance Deficits LOW - No comorbidities nor modifications of tasks    Clinical Decision Making LOW - Analysis of occupational profile, problem-focused assessments, limited treatment options    Overall Complexity LOW     Self-Care Home Management: 18 minutes    Marifer Daly OTR/L  Occupational  Therapy  [place] [facility]

## 2024-02-07 NOTE — CONSULTS
[facility]    PATIENT'S NAME: JORGE CASEY   ATTENDING PHYSICIAN: Tad Camilo MD   CONSULTING PHYSICIAN: Tad Camilo MD   PATIENT ACCOUNT#:   [id number]    LOCATION:   Room 2 A [facility]  MEDICAL RECORD #:   [id number]       YOB: 1975  ADMISSION DATE:       02/06/2024      CONSULT DATE:  02/06/2024    REPORT OF CONSULTATION      REASON FOR CONSULTATION:  Lumbar microdiscectomy.    HISTORY OF PRESENT ILLNESS:  The patient is a 48-year-old  female with chronic back pain radiating to the right buttock area, debilitating, failed outpatient conservative medical management options.  Imaging study suggestive of herniated disc at the lumbar area.  She was evaluated by Spine Neurosurgery, Dr. Roberto Carlos Dominique, and scheduled today for above-mentioned procedure.  Postoperatively, transferred to PACU for further monitoring.    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    PAST SURGICAL HISTORY:  D and C procedure and tubal ligation.    MEDICATIONS:  Please see medication reconciliation list.      ALLERGIES:  No known drug allergies.    SOCIAL HISTORY:  No tobacco, alcohol, or drug use.  Lives with her family.  Independent for basic activities of daily living.     FAMILY HISTORY:  Father had coronary artery disease and hypertension.  Mother had diabetes mellitus type 2.    REVIEW OF SYSTEMS:  Currently some back discomfort.  No radiation to the buttocks or lower extremities.  No dysuria.  No chest pain.  No abdominal pain.  Other 12-point review of systems is negative.      PHYSICAL EXAMINATION:    GENERAL:  Alert and oriented to time, place, and person.  No acute distress.  VITAL SIGNS:  Temperature 97.5, pulse 73, respiratory rate 20, blood pressure 141/82, pulse ox 100% on room air.    HEENT:  Atraumatic.  Oropharynx clear.  Moist mucous membranes.  Ears, nose normal.  Eyes:  Anicteric sclerae.   NECK:   Supple.  No lymphadenopathy.  Trachea midline.  Full range of motion.  LUNGS:  Clear to auscultation bilaterally.  Normal respiratory effort.    HEART:  Regular rate, rhythm.  S1 and S2 auscultated.  No murmur.   BACK:  Lumbar area with Dermabond, incision without complications.  EXTREMITIES:  No peripheral edema, clubbing, or cyanosis.  NEUROLOGIC:  Motor and sensory intact.     ASSESSMENT AND PLAN:  Lumbar spondylosis with radiculopathy, status post minimally invasive lumbar L5 to S1 bilateral laminoforaminotomies approaching from the left with microdiscectomy.  Neuro checks.  Pain control.  DVT prophylaxis.  Physical and occupational therapy.      Dictated By Tad Camilo MD  d: 02/06/2024 20:52:06  t: 02/06/2024 21:44:43  Job [id number]  FB/

## 2024-02-07 NOTE — DISCHARGE PLANNING
PT will be OP after follow-up with surgeon per PT report.    / to remain available for support and/or discharge planning.      Elizabeth Herrera MBA BSN RN CRRN   RN Case Manager  [phone]

## 2024-02-07 NOTE — PROGRESS NOTES
Neurosurgery Progress Note    Assessment:   Cher Goldman in room Room 2/Room 2-A, is a 48 year old female, Hospital Day ( LOS: 0 days ) hospitalized for <principal problem not specified>.      1 Day Post-Op s/p Procedure(s):  Minimally invasive lumbar 5 to sacral 1 bilateral laminoforaminotomies approaching from the left, microdiscectomy      The patient's other hospital problems include:   Active Hospital Problems    S/P lumbar microdiscectomy      Status post lumbar spine surgery for decompression of spinal cord        Plan:   -Every 4 hours neurochecks  -Continue pain control regimen  -PT/OT  -Decadron x2 days   -Patient may be appropriate for discharge today following clearance from therapy, medical clearance, and appropriate pain control  -Outpatient neurosurgery follow-up and discharge instructions documented  -Outpatient pain control sent to pharmacy    Plan of care discussed with Dr. Dominique  Subjective:   Patient reports improvement in the numbness in her left lower extremity.  She states that currently, it involves the heel and lateral aspect of her foot, where as yesterday, it involved her entire foot.  She denies any pain in her lower extremities bilaterally.  She has some expected, postoperative back pain.  She denies any lower extremity weakness.  She reports that she has been up and ambulating on her own with support from the walker and the IV pole.  She is interested in going home today after she works with therapy.    Objective:   VITALS: /86 (BP Location: Left arm)   Pulse 76   Temp 98.1 °F (36.7 °C) (Oral)   Resp 16   Ht 66\"   Wt 180 lb (81.6 kg)   LMP  (LMP Unknown)   SpO2 100%   BMI 29.05 kg/m²  Temp (24hrs), Av.5 °F (36.4 °C), Min:97.2 °F (36.2 °C), Max:98.1 °F (36.7 °C)       General: Well developed, well nourished, in no acute distress.     Incision: Open to air.  Incision clean, dry, and intact. No signs of gross drainage, warmth, erythema, or hematoma formation.  Dermabond  noted.    Neurological / Musculoskeletal: Awake, alert, and interactive. Recent and remote memory appear intact. Attention span and concentration are appropriate. No dysarthria. Coordination and motor control grossly intact. Patient follows commands briskly and appropriately.     Lumbar Spine:    Motor / Extremities   Hip   flexion Knee   extension Dorsiflexion EHL Plantarflexion   Sensation   R 5/5 5/5 5/5 5/5 5/5 Intact to light touch   L 5/5 5/5 5/5 5/5 5/5 Diminished to light touch at heel and lateral aspect of foot       I&O: I/O last 3 completed shifts:  In: 700 [I.V.:700]  Out: 1605 [Urine:1600; Blood:5]       Labs:   No results for input(s): \"RBC\", \"HGB\", \"HCT\", \"MCV\", \"MCH\", \"MCHC\", \"RDW\", \"NEPRELIM\", \"WBC\", \"PLT\" in the last 168 hours.  No results for input(s): \"GLU\", \"BUN\", \"CREATSERUM\", \"GFRAA\", \"GFRNAA\", \"EGFRCR\", \"CA\", \"ALB\", \"NA\", \"K\", \"CL\", \"CO2\", \"ALKPHO\", \"AST\", \"ALT\", \"BILT\", \"TP\" in the last 168 hours.   No results for input(s): \"PT\", \"INR\", \"PTT\" in the last 168 hours.     Imaging:  XR KNEE (1 OR 2 VIEWS), RIGHT (CPT=73560)    Result Date: 2/6/2024  CONCLUSION:  1. No acute fracture or dislocation.  Mild femoral tibial degeneration.    Dictated by (CST): Jones Grover MD on 2/06/2024 at 5:08 PM     Finalized by (CST): Jones Grover MD on 2/06/2024 at 5:08 PM          Results reviewed.    Is this a shared or split note between Advanced Practice Provider and Physician?  Yes    Chalino Umanzor PA-C  Physician Assistant- Neurosurgery   [facility]  2/7/2024, 8:15 AM        This document was created using Dragon voice recognition software and transcription variances may occur. Please contact documenting provider for clarification of contents if needed.

## 2024-02-08 PROBLEM — Z01.818 PREOP TESTING: Status: ACTIVE | Noted: 2024-02-08

## 2024-02-19 ENCOUNTER — TELEPHONE (OUTPATIENT)
Dept: SURGERY | Facility: CLINIC | Age: 49
End: 2024-02-19

## 2024-02-19 ENCOUNTER — OFFICE VISIT (OUTPATIENT)
Dept: SURGERY | Facility: CLINIC | Age: 49
End: 2024-02-19
Payer: COMMERCIAL

## 2024-02-19 VITALS
WEIGHT: 180 LBS | OXYGEN SATURATION: 99 % | BODY MASS INDEX: 28.93 KG/M2 | HEART RATE: 89 BPM | RESPIRATION RATE: 18 BRPM | HEIGHT: 66 IN

## 2024-02-19 DIAGNOSIS — K62.89 ANAL SPHINCTER INCOMPETENCE: ICD-10-CM

## 2024-02-19 DIAGNOSIS — Z98.890 POSTOPERATIVE STATE: Primary | ICD-10-CM

## 2024-02-19 DIAGNOSIS — Z98.890 S/P LUMBAR MICRODISCECTOMY: ICD-10-CM

## 2024-02-19 DIAGNOSIS — M54.16 LUMBAR RADICULITIS: ICD-10-CM

## 2024-02-19 DIAGNOSIS — Z98.890 STATUS POST LUMBAR SPINE SURGERY FOR DECOMPRESSION OF SPINAL CORD: ICD-10-CM

## 2024-02-19 DIAGNOSIS — K59.00 CONSTIPATION, UNSPECIFIED CONSTIPATION TYPE: ICD-10-CM

## 2024-02-19 DIAGNOSIS — Z48.89 ENCOUNTER FOR POSTOPERATIVE WOUND CHECK: ICD-10-CM

## 2024-02-19 RX ORDER — PREGABALIN 150 MG/1
150 CAPSULE ORAL 2 TIMES DAILY
Qty: 56 CAPSULE | Refills: 1 | Status: SHIPPED | OUTPATIENT
Start: 2024-02-19

## 2024-02-19 NOTE — TELEPHONE ENCOUNTER
[place] office received a LSO prescription order from [facility].   Prescription date - 02/07/2024  Physician reviewed and signed order.   Signed order has been faxed to [facility] at [phone].

## 2024-02-19 NOTE — PROGRESS NOTES
Established Neurosurgery Patient    Patient: Cher Goldman  Medical Record Number: [id number]  YOB: 1975  PCP: Meg Gonzalez DO    Reason for visit: 2-week postoperative visit     HISTORY OF PRESENTING ILLNESS:  Cher Goldman is a pleasant 48 year old female who returns to the neurosurgery clinic today approximately 2 weeks s/p minimally invasive L5-S1 bilateral laminoforaminotomies approaching from the left, microdiscectomy by Dr. Dominique on 2/6/24.  The patient reports that from a pain standpoint, she has been doing well since surgery.  She notes intermittent back stiffness, for which she utilizes Robaxin at night.  She states that she stopped narcotic use postop day 2.  For the most part, she has not had any radicular lower extremity pain.  She will intermittently experience a \"luis m horse\" in her right thigh, but this only occurs at night.  She continues to endorse numbness in her left lower extremity, particularly in her foot, calf, and buttock.  She states that it typically worsens with movement, she provided an example of ambulating in a grocery store and only being able to make it through an aisle or two before she feels unsteady.  She denies any right lower extremity numbness.  Her main concern is that she has no control over her flatus.  She states that she feels that she cannot hold it in  internally.  She has a history of constipation for which she takes Motegrity, fiber, and a stool softener.  She has not had stool incontinence.  She states that she went to pelvic floor therapy in the past for constipation and will begin performing some of those exercises to help with her current issue.  She has not had any issues with her incision site.  No constitutional symptoms or symptoms concerning for infection.    Past Medical History:   Diagnosis Date    Back problem     Migraines 2006    Under control with neurologist      Past Surgical History:   Procedure Laterality Date    D & C   01/01/2000    D & C  01/01/2007    TUBAL LIGATION  2019      Family History   Problem Relation Age of Onset    Heart Disease Father         MI age 60    Other (Throat Cancer [Other]) Father     Hypertension Father     Stroke Father     Heart Disease Mother         MI age 57    Hypertension Mother     Diabetes Mother     Arrhythmia Mother     Heart Attack Mother     Stroke Mother     Other (Throat Cancer [Other]) Paternal Grandfather     Ovarian Cancer Maternal Grandmother     Diabetes Maternal Grandmother       Social History     Socioeconomic History    Marital status:    Occupational History    Occupation: [employer]   Tobacco Use    Smoking status: Never    Smokeless tobacco: Never   Vaping Use    Vaping Use: Never used   Substance and Sexual Activity    Alcohol use: No    Drug use: No    Sexual activity: Yes     Partners: Male     Birth control/protection: OCP     Comment: same parnter   Other Topics Concern    Exercise No      No Known Allergies   Current Medications:  Current Outpatient Medications   Medication Sig Dispense Refill    dexamethasone (DECADRON) 4 MG tablet Take 1 tablet (4 mg total) by mouth 2 (two) times daily with meals. 3 tablet 0    pregabalin 100 MG Oral Cap Take 1 capsule (100 mg total) by mouth 2 (two) times daily. 60 capsule 1    ondansetron 4 MG Oral Tablet Dispersible Take 1 tablet (4 mg total) by mouth every 4 (four) hours as needed for Nausea. 60 tablet 0    oxyCODONE 5 MG Oral Tab Take 1 tablet (5 mg total) by mouth every 4 (four) hours as needed. 30 tablet 0    methocarbamol 500 MG Oral Tab Take 1 tablet (500 mg total) by mouth 3 (three) times daily as needed. 60 tablet 0    acetaminophen 500 MG Oral Tab Take 1 tablet (500 mg total) by mouth every 4 (four) hours as needed. 120 tablet 0    Naloxone HCl 4 MG/0.1ML Nasal Liquid 4 mg by Nasal route as needed. If patient remains unresponsive, repeat dose in other nostril 2-5 minutes after first dose. 1 kit 0     MOTEGRITY 2 MG Oral Tab       nortriptyline 10 MG Oral Cap Take 4 capsules (40 mg total) by mouth nightly.      SUMAtriptan Succinate 100 MG Oral Tab 1 po prn migraine, may repeat x 1 in 2 hours.  NTE 2 tabs in 24 hours.          REVIEW OF SYSTEMS:  Comprehensive review of systems completed and negative with the exception of aforementioned information in the HPI.     PHYSICAL EXAM:    2/19/2024 9:39 AM    Pulse 89   Resp 18   SpO2 99 %   Weight 180 lb (81.6 kg)   Height 66\"    Other Vitals   BMI 29.05 kg/m2   BSA 1.91 m2   LMP LMP Unknown   Comment MONTHS AGO   Menstrual status Having periods   OB/Gyn status reviewed 2/6/2024   Tobacco   Smoking status Never   Smokeless status Never   Reviewed 2/19/2024        Wt Readings from Last 6 Encounters:   02/06/24 180 lb (81.6 kg)   01/29/24 175 lb (79.4 kg)   12/13/23 175 lb (79.4 kg)   12/11/23 175 lb (79.4 kg)   11/01/23 175 lb (79.4 kg)   09/26/23 175 lb (79.4 kg)        General: Well developed, well nourished, in no acute distress. Ambulates without assistance.    Incision: Open to air.  Incision clean, dry, and intact.  No erythema, warmth, swelling, or gross drainage appreciated.  Dermabond noted.    HEENT: Normocephalic, atraumatic.    Respirations: Non-labored     Neurologic / Musculoskeletal: Awake, alert, and interactive. Recent and remote memory appear intact. Attention span and concentration are appropriate. No dysarthria. Appropriately names objects. Coordination and motor control grossly intact. Patient follows commands briskly and appropriately.     Lumbar Spine:    Motor / Extremities   Hip   flexion Knee   extension Dorsiflexion EHL Plantarflexion   Sensation   R 5/5 5/5 5/5 5/5 5/5 Intact to light touch   L 5/5 5/5 5/5 5/5 5/5 Intact to light touch       IMAGING:  No new neurosurgical imaging to review today      ASSESSMENT / PLAN:    ICD-10-CM   1. Postoperative state  Z98.890      2. S/P lumbar microdiscectomy  Z98.890      3. Constipation, unspecified  constipation type  K59.00      4. Anal sphincter incompetence  K62.89      5. Encounter for postoperative wound check  Z48.89      6. Status post lumbar spine surgery for decompression of spinal cord  Z98.890      7. Lumbar radiculitis  M54.16        Cher Goldman returns to the clinic today approximately 2 weeks s/p minimally invasive L5-S1 bilateral laminoforaminotomies approaching from the left, microdiscectomy by Dr. Dominique on 2/6/24.  The patient continues to endorse numbness in her left lower extremity which is affecting her daily life.  She has full strength on exam.  We will trial increasing her Lyrica to 150 mg twice daily.  To address her flatness concerns, will place a referral to pelvic floor therapy.  Discussed that she is early on in her postoperative course and that some of the symptoms she is experiencing postoperatively may be related secondary to her nerves continuing to heal.  We will monitor closely, but in the interim, will trial the aforementioned therapies to provide some relief and hopefully contribute to progress in her recovery.     -Medications Prescribed: Increase pregabalin to 150 mg twice daily  -Imaging Ordered: none   -Referrals Placed: Pelvic floor therapy  -Follow up: 3/18/24 at 0915     Plan was reviewed and discussed in detail with the patient. Patient encouraged to call the office with any questions or concerns of new/worsening neurologic symptoms. Patient demonstrated good understanding and was agreeable with the plan.     Visit time: 25 minutes   Over 50% of that time was spent providing patient education and discussing care plan.    Chalino Umanzor PA-C  Physician Assistant- Neurosurgery   [facility]  2/19/2024, 7:32 AM

## 2024-02-28 ENCOUNTER — PATIENT MESSAGE (OUTPATIENT)
Dept: SURGERY | Facility: CLINIC | Age: 49
End: 2024-02-28

## 2024-02-29 NOTE — TELEPHONE ENCOUNTER
Lyrica 150 mg prescribed 2.19.24    Questions sent to patient via [employer] to request additional input and to provide some input until provider is able to get back to patient.

## 2024-02-29 NOTE — TELEPHONE ENCOUNTER
The patient was called.  I discussed with Dr. Dominique.  Recommended the patient wean off Lyrica as her edema could be a side effect.  Patient advised to gradually decrease over time.  I recommend over 1 to 2 weeks.  She has 100 mg tablets left, which will assist with weaning.    Patient agreed to the plan, verbalized understanding was very appreciative.

## 2024-02-29 NOTE — TELEPHONE ENCOUNTER
From: Cher Goldman  To: Roberto Carlos Dominique  Sent: 2/28/2024 7:39 PM CST  Subject: Side effects     Hello. I am reaching out to you as I noticed that my ankles are getting more and more swollen. I know Lyrica can cause that, but just as a precaution I need your input. Do we need to do anything about it, or just see if it goes away? I am attaching pics for bits ankles. The right looks better than the left.   Thank you in advance for your response.   EF

## 2024-03-18 ENCOUNTER — OFFICE VISIT (OUTPATIENT)
Dept: SURGERY | Facility: CLINIC | Age: 49
End: 2024-03-18
Payer: COMMERCIAL

## 2024-03-18 VITALS
BODY MASS INDEX: 28.13 KG/M2 | HEIGHT: 66 IN | HEART RATE: 94 BPM | WEIGHT: 175 LBS | DIASTOLIC BLOOD PRESSURE: 78 MMHG | SYSTOLIC BLOOD PRESSURE: 113 MMHG

## 2024-03-18 DIAGNOSIS — Z48.89 ENCOUNTER FOR POSTOPERATIVE WOUND CHECK: ICD-10-CM

## 2024-03-18 DIAGNOSIS — Z98.890 S/P LUMBAR MICRODISCECTOMY: Primary | ICD-10-CM

## 2024-03-18 DIAGNOSIS — R53.81 PHYSICAL DECONDITIONING: ICD-10-CM

## 2024-03-18 DIAGNOSIS — R20.0 LEFT LEG NUMBNESS: ICD-10-CM

## 2024-03-18 DIAGNOSIS — Z98.890 POSTOPERATIVE STATE: ICD-10-CM

## 2024-03-18 NOTE — PROGRESS NOTES
Last Office Visit: 02/19/2024    Established patient presents today for her 6 week post operative visit from Minimally invasive lumbar 5 to sacral 1 bilateral laminoforaminotomies approaching from the left, microdiscectomy surgery performed on 02/06/2024 by Dr. Dominique.  Patient denies lower back pain but she states she has lower back stiffness. She also has persistent left leg numbness that is present in her left foot, heel, thigh, and back of foot.

## 2024-03-18 NOTE — PROGRESS NOTES
Established Neurosurgery Patient    Patient: Cher Goldman  Medical Record Number: [id number]  YOB: 1975  PCP: Meg Gonzalez DO    Reason for visit: 6-week postoperative appointment     HISTORY OF PRESENTING ILLNESS:  Cher Goldman is a pleasant 48 year old female who returns to the neurosurgery clinic today approximately 6 weeks s/p minimally invasive L5-S1 bilateral laminal foraminotomies approaching on the left, microdiscectomy by Dr. Dominique in 2/6/2024.  The patient continues to endorse minimal back pain since surgery.  She has not been utilizing narcotic medications or Robaxin.  She notes some occasional back stiffness, particularly with standing for extended periods of time or with movements.  She no longer has any right lower extremity complaints.  She has gained control over her flatus.  She feels that her bowel regimen has become more regular.  She did not follow through with pelvic floor therapy given her improvements.  She is seeing gradual improvement in her left lower extremity numbness.  She notes numbness more prominent in her heel and calf, and less so in her posterior thigh.  She has intermittent tension in her left Achilles and calf. She has been weaning her Lyrica to 150 mg once a day versus twice a day.  She has not had constitutional symptoms or symptoms concerning for sepsis.  She denies any issues with her incision site.  She has felt weaker since surgery, stating that even lifting the laundry detergent felt heavy to her.  She has also noticed some left lower extremity subjective weakness.    Past Medical History:   Diagnosis Date    Back problem     Migraines 2006    Under control with neurologist      Past Surgical History:   Procedure Laterality Date    D & C  01/01/2000    D & C  01/01/2007    LAMINOTOMY, ADDL LUMBAR  02/06/2024    Minimally invasive lumbar 5 to sacral 1 bilateral laminoforaminotomies approaching from the left, microdiscectomy    TUBAL LIGATION  2019       Family History   Problem Relation Age of Onset    Heart Disease Father         MI age 60    Other (Throat Cancer [Other]) Father     Hypertension Father     Stroke Father     Heart Disease Mother         MI age 57    Hypertension Mother     Diabetes Mother     Arrhythmia Mother     Heart Attack Mother     Stroke Mother     Other (Throat Cancer [Other]) Paternal Grandfather     Ovarian Cancer Maternal Grandmother     Diabetes Maternal Grandmother       Social History     Socioeconomic History    Marital status:    Occupational History    Occupation: [employer]   Tobacco Use    Smoking status: Never    Smokeless tobacco: Never   Vaping Use    Vaping Use: Never used   Substance and Sexual Activity    Alcohol use: No    Drug use: No    Sexual activity: Yes     Partners: Male     Birth control/protection: OCP     Comment: same parnter   Other Topics Concern    Caffeine Concern No    Stress Concern Yes    Weight Concern Yes    Special Diet No    Exercise No    Seat Belt No      No Known Allergies   Current Medications:  Current Outpatient Medications   Medication Sig Dispense Refill    pregabalin 150 MG Oral Cap Take 1 capsule (150 mg total) by mouth 2 (two) times daily. 56 capsule 1    acetaminophen 500 MG Oral Tab Take 1 tablet (500 mg total) by mouth every 4 (four) hours as needed. 120 tablet 0    MOTEGRITY 2 MG Oral Tab       nortriptyline 10 MG Oral Cap Take 4 capsules (40 mg total) by mouth nightly.      SUMAtriptan Succinate 100 MG Oral Tab 1 po prn migraine, may repeat x 1 in 2 hours.  NTE 2 tabs in 24 hours.          REVIEW OF SYSTEMS:  Comprehensive review of systems completed and negative with the exception of aforementioned information in the HPI.     PHYSICAL EXAM:  /78 (BP Location: Left arm, Patient Position: Sitting, Cuff Size: adult)   Pulse 94   Ht 66\"   Wt 175 lb (79.4 kg)   LMP  (LMP Unknown)   BMI 28.25 kg/m²   Body mass index is 28.25 kg/m².  Wt Readings from Last  6 Encounters:   03/18/24 175 lb (79.4 kg)   02/19/24 180 lb (81.6 kg)   02/06/24 180 lb (81.6 kg)   01/29/24 175 lb (79.4 kg)   12/13/23 175 lb (79.4 kg)   12/11/23 175 lb (79.4 kg)        General: Well developed, well nourished, in no acute distress. Ambulates without assistance.    Incision: Healing well.  No erythema, warmth, swelling, or gross drainage appreciated.  Intact.    HEENT: Normocephalic, atraumatic.    Respirations: Non-labored     Neurologic / Musculoskeletal: Awake, alert, and interactive. Recent and remote memory appear intact. Attention span and concentration are appropriate. No dysarthria. Appropriately names objects. Coordination and motor control grossly intact. Patient follows commands briskly and appropriately.     Lumbar Spine:    Motor / Extremities   Hip   flexion Knee   extension Dorsiflexion EHL Plantarflexion   R 5/5 5/5 5/5 5/5 5/5   L 5/5 5/5 5/5 5/5 5/5       IMAGING:  No new neurosurgical imaging to review at this time      ASSESSMENT / PLAN:    ICD-10-CM   1. S/P lumbar microdiscectomy  Z98.890      2. Postoperative state  Z98.890      3. Physical deconditioning  R53.81      4. Left leg numbness  R20.0      5. Encounter for postoperative wound check  Z48.89         Cher Goldman returns to the clinic today approximately 6 weeks s/p minimally invasive L5-S1 bilateral laminoforaminotomies approaching from left, microdiscectomy by Dr. Dominique on 2/6/2024.  The patient is doing well overall from a surgical standpoint.  She has had gradual improvement in her left lower extremity numbness.  She is working on weaning her Lyrica.  She has not had any issues with her incision site.  There are no signs/symptoms concerning for infection, locally or systemically.  She is neurologically intact on examination.  Will plan to start physical therapy for physical deconditioning.  She will follow-up on 5/2/2024 at her 3-month appointment.    -Medications Prescribed: None  -Imaging Ordered:  None  -Referrals Placed: physical therapy   -Follow up: 5/2/24 at 1600     Plan was reviewed and discussed in detail with the patient. Patient encouraged to call the office with any questions or concerns of new/worsening neurologic symptoms. Patient demonstrated good understanding and was agreeable with the plan.     Visit time: 20 minutes   Over 50% of that time was spent providing patient education and discussing care plan.    Chalino Umanzor PA-C  Physician Assistant- Neurosurgery   [facility]  3/18/2024, 10:06 AM

## 2024-05-02 ENCOUNTER — HOSPITAL ENCOUNTER (OUTPATIENT)
Dept: GENERAL RADIOLOGY | Facility: HOSPITAL | Age: 49
Discharge: HOME | End: 2024-05-02
Attending: PHYSICIAN ASSISTANT
Payer: COMMERCIAL

## 2024-05-02 ENCOUNTER — OFFICE VISIT (OUTPATIENT)
Dept: SURGERY | Facility: CLINIC | Age: 49
End: 2024-05-02
Payer: COMMERCIAL

## 2024-05-02 VITALS
BODY MASS INDEX: 29.57 KG/M2 | HEIGHT: 66 IN | HEART RATE: 75 BPM | WEIGHT: 184 LBS | DIASTOLIC BLOOD PRESSURE: 84 MMHG | SYSTOLIC BLOOD PRESSURE: 124 MMHG

## 2024-05-02 DIAGNOSIS — M50.30 DDD (DEGENERATIVE DISC DISEASE), CERVICAL: ICD-10-CM

## 2024-05-02 DIAGNOSIS — R20.0 NUMBNESS AND TINGLING IN RIGHT HAND: ICD-10-CM

## 2024-05-02 DIAGNOSIS — R20.2 NUMBNESS AND TINGLING IN RIGHT HAND: ICD-10-CM

## 2024-05-02 DIAGNOSIS — M54.12 CERVICAL RADICULOPATHY: Primary | ICD-10-CM

## 2024-05-02 DIAGNOSIS — Z98.890 S/P LUMBAR MICRODISCECTOMY: ICD-10-CM

## 2024-05-02 DIAGNOSIS — M54.12 CERVICAL RADICULOPATHY: ICD-10-CM

## 2024-05-02 PROCEDURE — 72050 X-RAY EXAM NECK SPINE 4/5VWS: CPT | Performed by: PHYSICIAN ASSISTANT

## 2024-05-02 RX ORDER — PREGABALIN 100 MG/1
100 CAPSULE ORAL 2 TIMES DAILY
Qty: 90 CAPSULE | Refills: 0 | Status: SHIPPED | OUTPATIENT
Start: 2024-05-02

## 2024-05-02 NOTE — PROGRESS NOTES
Last office visit: 3/18/24  Last procedure: 2/6/24  Physical Therapy / Injections: Patient denies completing any recent Physical Therapy / Injections  Numbness / Tingling: Patient reports numbness and tingling on right shoulder radiating to hand on the thumb/pointer/middle finger.  Pain Level: 0/10.   Med rec complete per pt at bedside  Interviewed pt with family at bedside with permission from pt  Allergies reviewed and updated.

## 2024-05-02 NOTE — PROGRESS NOTES
Patient: Cher Goldman  Medical Record Number: [id number]  YOB: 1975  PCP: Meg Gonzalez DO    Reason for visit: Lumbar follow up, post op visit, cervical radiculopathy    HISTORY OF CHIEF COMPLAINT:    Cher Goldman is a very pleasant 48 year old female who presents today for: Lumbar follow up, post op visit, cervical radiculopathy  S/p 2/6/24: Dr. Dominique: Minimally invasive L5-S1 bilateral laminoforaminotomies approaching from the left, microdiscectomy   The patient endorses that she is doing very well from a lumbar standpoint.  No new neurologic complaints the lower extremities.  2 to 3 weeks prior, she aggravated her neck and right upper extremity.  Pain that radiates down to the first second and third digit with associated numbness and tingling.  Aggravated with typing and bending the right second digit.  She has a history of a peptic ulcer.  No left upper extremity symptoms.  She did well with Lyrica for her lumbar spine and would be open to resuming Lyrica for her right upper extremity pain.  She has an appointment with Dr. Anderson in June.    Last hx: 3/18/24  HISTORY OF PRESENTING ILLNESS:  Cher Goldman is a pleasant 48 year old female who returns to the neurosurgery clinic today approximately 6 weeks s/p minimally invasive L5-S1 bilateral laminal foraminotomies approaching on the left, microdiscectomy by Dr. Dominique in 2/6/2024.  The patient continues to endorse minimal back pain since surgery.  She has not been utilizing narcotic medications or Robaxin.  She notes some occasional back stiffness, particularly with standing for extended periods of time or with movements.  She no longer has any right lower extremity complaints.  She has gained control over her flatus.  She feels that her bowel regimen has become more regular.  She did not follow through with pelvic floor therapy given her improvements.  She is seeing gradual improvement in her left lower extremity numbness.  She notes  numbness more prominent in her heel and calf, and less so in her posterior thigh.  She has intermittent tension in her left Achilles and calf. She has been weaning her Lyrica to 150 mg once a day versus twice a day.  She has not had constitutional symptoms or symptoms concerning for sepsis.  She denies any issues with her incision site.  She has felt weaker since surgery, stating that even lifting the laundry detergent felt heavy to her.  She has also noticed some left lower extremity subjective weakness.     Past Medical History:    Back problem    Migraines    Under control with neurologist      Past Surgical History:   Procedure Laterality Date    D & c  01/01/2000    D & c  01/01/2007    Laminotomy, addl lumbar  02/06/2024    Minimally invasive lumbar 5 to sacral 1 bilateral laminoforaminotomies approaching from the left, microdiscectomy    Tubal ligation  2019      Family History   Problem Relation Age of Onset    Heart Disease Father         MI age 60    Other (Throat Cancer [Other]) Father     Hypertension Father     Stroke Father     Heart Disease Mother         MI age 57    Hypertension Mother     Diabetes Mother     Arrhythmia Mother     Heart Attack Mother     Stroke Mother     Other (Throat Cancer [Other]) Paternal Grandfather     Ovarian Cancer Maternal Grandmother     Diabetes Maternal Grandmother       Social History     Socioeconomic History    Marital status:    Occupational History    Occupation: [employer]   Tobacco Use    Smoking status: Never    Smokeless tobacco: Never   Vaping Use    Vaping status: Never Used   Substance and Sexual Activity    Alcohol use: No    Drug use: No    Sexual activity: Yes     Partners: Male     Birth control/protection: OCP     Comment: same clayton   Other Topics Concern    Caffeine Concern No    Stress Concern Yes    Weight Concern Yes    Special Diet No    Exercise No    Seat Belt No      No Known Allergies   Current Medications:  Current  Outpatient Medications   Medication Sig Dispense Refill    nortriptyline 10 MG Oral Cap Take 4 capsules (40 mg total) by mouth nightly.      SUMAtriptan Succinate 100 MG Oral Tab 1 po prn migraine, may repeat x 1 in 2 hours.  NTE 2 tabs in 24 hours.      pregabalin 150 MG Oral Cap Take 1 capsule (150 mg total) by mouth 2 (two) times daily. 56 capsule 1    acetaminophen 500 MG Oral Tab Take 1 tablet (500 mg total) by mouth every 4 (four) hours as needed. 120 tablet 0    MOTEGRITY 2 MG Oral Tab           REVIEW OF SYSTEMS   Comprehensive review of systems done. Negative except what is outlined in the above HPI.     PHYSICAL EXAMIMATION    height is 66\" and weight is 184 lb (83.5 kg). Her blood pressure is 124/84 and her pulse is 75.   GENERAL: Very pleasant patient is in no apparent distress. Sitting comfortably in the examination chair.   HEENT: Normocephalic, atraumatic.  RESPIRATORY RATE: Easy and Even  SKIN: Warm and dry  NEURO: Awake, alert and orientated. Speech fluent, comprehension intact, answering questions appropriately.     SPINE:  Gait/Coordination: Gait deferred     Upper Extremity Strength:     Deltoid  Biceps  Triceps    Intrinsics      Right 5 5 5 5 5     Left 5 5 5 5 5     Lower Extremity Strength:     Iliopsoas  Hamstrings   Quads    D-flexion P-flexion Great Toe   Right       5         5       5         5 5 5   Left       5         5       5         5 5 5     Tests:   Test Right   (POS or NEG) Left   (POS or NEG)   Hoffmans Neg Neg   Spurlings + Neg   Clonus Neg Neg     DTR 2/4 BUE and BLE    DATA:   None    IMAGING:     Study Result    Narrative   PROCEDURE: XR CERVICAL SPINE (4 OR 5 VIEWS) (CPT=72050)     COMPARISON: None.     INDICATIONS: Neck pain on right side.     TECHNIQUE: Cervical spine radiographs (5 views)     FINDINGS:     ALIGNMENT: Minimal degenerative retrolisthesis of C5 on C6 and C3 on C4.  VERTEBRAL BODIES:   Normal.  No fracture, or bony lesion.    DISC SPACES: Endplate  osteophytes at C3-4 through C6-7.  Mild narrowing of the C5-6 disc.  Other disc spaces are relatively preserved.  PREVERTEBRAL SOFT TISSUES: Negative.    OBLIQUE VIEWS: Mild narrowing of left C6 foramen.  No significant right foraminal narrowing.  OTHER: Negative.                 Impression   CONCLUSION:  1. Multilevel degenerative disc disease/spondylosis most pronounced at C5-6.  Mild left C6 foraminal narrowing.  2. Minimal degenerative retrolisthesis of C5 on C6, and C3 on C4.           Dictated by (CST): Erik Burns MD on 4/29/2021 at 2:01 PM      Finalized by (CST): Erik Burns MD on 4/29/2021 at 2:04 PM     MEDICAL DECISION MAKING:     ASSESSMENT and PLAN:  1. Cervical radiculopathy    2. DDD (degenerative disc disease), cervical    3. Numbness and tingling in right hand    4. S/P lumbar microdiscectomy      PLAN:   1. Medication:    - Lyrica prescribed:    - Side effects reviewed, including but not limited to, drowsiness. Do not drive or operate machinery on this medication therapy until patient sees how it will affect her.   2. Imaging:    - Reviewed today:    - XR cervical spine:     - Agree with radiology    - Ordered today:    - XR cervical spine:     - Further assess likely right cervical radiculopathy with numbness/tingling  3. Activity:    - PT ordered  4. Physiatry:   - See Dr. Anderson as scheduled   5. Follow up in 1 month or call or follow up sooner or go to the ED for any new, worsening or concerning signs or symptoms     Dr. Dominique and ADIN reviewed imaging and discussed the plan. Dr. Dominique agrees with the plan. Dr. Dominique and ADIN reviewed imaging and discussed the plan with the patient. The patient agrees with the plan, verbalized understanding and is appreciative. All questions were sought out and thoroughly answered to satisfaction.       Total visit time: 30 minutes  More than 50% spent coordinating care, providing patient education, reviewing imaging, discussing further imaging and  counseling.    Karie Ornelas M.S., PA-C  [address], [place] [address]  [phone]  5/2/2024 3:58 PM    Dragon speech recognition software was used to prepare this note. If a word or phrase is confusing, it is likely due to a failure of recognition. Please contact me with any questions or clarifications.

## 2024-05-02 NOTE — PATIENT INSTRUCTIONS
Refill policies:    Allow 2-3 business days for refills; controlled substances may take longer.  Contact your pharmacy at least 5 days prior to running out of medication and have them send an electronic request or submit request through the “request refill” option in your [employer] account.  Refills are not addressed on weekends; covering physicians do not authorize routine medications on weekends.  No narcotics or controlled substances are refilled after noon on Fridays or by on call physicians.  By law, narcotics must be electronically prescribed.  A 30 day supply with no refills is the maximum allowed.  If your prescription is due for a refill, you may be due for a follow up appointment.  To best provide you care, patients receiving routine medications need to be seen at least once a year.  Patients receiving narcotic/controlled substance medications need to be seen at least once every 3 months.  In the event that your preferred pharmacy does not have the requested medication in stock (e.g. Backordered), it is your responsibility to find another pharmacy that has the requested medication available.  We will gladly send a new prescription to that pharmacy at your request.    Scheduling Tests:    If your physician has ordered radiology tests such as MRI or CT scans, please contact Central Scheduling at [phone] right away to schedule the test.  Once scheduled, the [facility] Centralized Referral Team will work with your insurance carrier to obtain pre-certification or prior authorization.  Depending on your insurance carrier, approval may take 3-10 days.  It is highly recommended patients assure they have received an authorization before having a test performed.  If test is done without insurance authorization, patient may be responsible for the entire amount billed.      Precertification and Prior Authorizations:  If your physician has recommended that you have a procedure or additional testing performed the [facility]  Centralized Referral Team will contact your insurance carrier to obtain pre-certification or prior authorization.    You are strongly encouraged to contact your insurance carrier to verify that your procedure/test has been approved and is a COVERED benefit.  Although the [facility] Centralized Referral Team does its due diligence, the insurance carrier gives the disclaimer that \"Although the procedure is authorized, this does not guarantee payment.\"    Ultimately the patient is responsible for payment.   Thank you for your understanding in this matter.  Paperwork Completion:  If you require FMLA or disability paperwork for your recovery, please make sure to either drop it off or have it faxed to our office at [phone]. Be sure the form has your name and date of birth on it.  The form will be faxed to our Forms Department and they will complete it for you.  There is a 25$ fee for all forms that need to be filled out.  Please be aware there is a 10-14 day turnaround time.  You will need to sign a release of information (VALERIE) form if your paperwork does not come with one.  You may call the Forms Department with any questions at [phone].  Their fax number is [phone].

## 2025-02-05 ENCOUNTER — OFFICE VISIT (OUTPATIENT)
Dept: PHYSICAL MEDICINE AND REHAB | Facility: CLINIC | Age: 50
End: 2025-02-05
Payer: COMMERCIAL

## 2025-02-05 VITALS — HEIGHT: 66 IN | WEIGHT: 184 LBS | BODY MASS INDEX: 29.57 KG/M2

## 2025-02-05 DIAGNOSIS — Z98.890 S/P LUMBAR MICRODISCECTOMY: ICD-10-CM

## 2025-02-05 DIAGNOSIS — R20.0 NUMBNESS OF RIGHT HAND: Primary | ICD-10-CM

## 2025-02-05 DIAGNOSIS — Z98.890 STATUS POST LUMBAR SPINE SURGERY FOR DECOMPRESSION OF SPINAL CORD: ICD-10-CM

## 2025-02-05 DIAGNOSIS — M50.90 CERVICAL DISC DISEASE: ICD-10-CM

## 2025-02-05 PROBLEM — M51.26 LUMBAR HERNIATED DISC: Status: RESOLVED | Noted: 2019-08-27 | Resolved: 2025-02-05

## 2025-02-05 PROBLEM — M47.816 LUMBAR FACET JOINT SYNDROME: Status: RESOLVED | Noted: 2021-01-21 | Resolved: 2025-02-05

## 2025-02-05 PROBLEM — M48.062 SPINAL STENOSIS OF LUMBAR REGION WITH NEUROGENIC CLAUDICATION: Status: RESOLVED | Noted: 2023-12-11 | Resolved: 2025-02-05

## 2025-02-05 PROCEDURE — 99214 OFFICE O/P EST MOD 30 MIN: CPT | Performed by: PHYSICAL MEDICINE & REHABILITATION

## 2025-02-05 PROCEDURE — 3008F BODY MASS INDEX DOCD: CPT | Performed by: PHYSICAL MEDICINE & REHABILITATION

## 2025-02-05 NOTE — PROGRESS NOTES
Cervical [facility]    Chief Complaint:    Chief Complaint   Patient presents with    New Patient     LOV 12/11/23 pt is here with complaints of continuous. XR of spine cervical was completed 5/2/24. Reports n/t in fingers of both hands R>L. Not taking any pain meds or muscle relaxer's. Pain 0/10     Nursing note reviewed and verified.    Patient was last seen on 12/11/2023.  She had the L5-S1 laminectomy and discectomy on 2/6/2024 which helped.  Her back is doing much better.    She will still get left foot and ankle swelling at times even being off of the Lyrica.  She has had ultrasound an doppler studies which have been normal.  She has now been placed on a diuretic.  She will have more swelling if she does a lot of walking.      She has been having right 1-3 finger numbness for the last 7 months.  She would have this numbness and pain in the middle of the night.  Lyrica would help this pain and numbness.  She has been doing more hand stretches and these seem to help some.  She has been doing a lot of typing.  She denies any weakness.  They are worse in the cold weather.she denies neck pain and any other arm or shoulder pain or numbness.  She can have stiffness in the hand as well at times.    Past Medical History   Past Medical History:    Back problem    Migraines    Under control with neurologist       Past Surgical History   Past Surgical History:   Procedure Laterality Date    D & c  01/01/2000    D & c  01/01/2007    Laminotomy, addl lumbar  02/06/2024    Minimally invasive lumbar 5 to sacral 1 bilateral laminoforaminotomies approaching from the left, microdiscectomy    Tubal ligation  2019       Family History   Family History   Problem Relation Age of Onset    Heart Disease Father         MI age 60    Other (Throat Cancer [Other]) Father     Hypertension Father     Stroke Father     Heart Disease Mother         MI age 57    Hypertension Mother     Diabetes Mother     Arrhythmia Mother     Heart Attack  Mother     Stroke Mother     Other (Throat Cancer [Other]) Paternal Grandfather     Ovarian Cancer Maternal Grandmother     Diabetes Maternal Grandmother        Social History   Social History     Socioeconomic History    Marital status:      Spouse name: Not on file    Number of children: Not on file    Years of education: Not on file    Highest education level: Not on file   Occupational History    Occupation: [employer]   Tobacco Use    Smoking status: Never    Smokeless tobacco: Never   Vaping Use    Vaping status: Never Used   Substance and Sexual Activity    Alcohol use: No    Drug use: No    Sexual activity: Yes     Partners: Male     Birth control/protection: OCP     Comment: same clayton   Other Topics Concern     Service Not Asked    Blood Transfusions Not Asked    Caffeine Concern Yes    Occupational Exposure Not Asked    Hobby Hazards Not Asked    Sleep Concern Not Asked    Stress Concern Yes    Weight Concern Yes    Special Diet No    Back Care Not Asked    Exercise Yes    Bike Helmet Not Asked    Seat Belt No    Self-Exams Not Asked   Social History Narrative    The patient uses the following assistive device(s):  quad cane.      The patient does live in a home with stairs.     Social Drivers of Health     Food Insecurity: Not on file   Transportation Needs: Not on file   Stress: Not on file   Housing Stability: Low Risk  (7/9/2021)    [facility]    Housing Stability     Mortgage Payment Concerns?: Not on file     Number of Places Lived in the Last Year: Not on file     Unstable Housing?: Not on file       PE:  The patient does appear in her stated age in no distress.  The patient is well groomed.    Psychiatric:  The patient is alert and oriented x 3.  The patient has a normal affect and mood.      Respiratory:  No acute respiratory distress. Patient does not have a cough.    HEENT:  Extraocular muscles  are intact. There is no kern icterus. Pupils are equal, round, and reactive to light. No redness or discharge bilaterally.    Skin:  There are no rashes or lesions.    Lymph Nodes:  The patient has no palpable submandibular, supraclavicular, and cervical lymph nodes..    Vitals:  There were no vitals filed for this visit.    Cervical Spine:    Posture: mild chin forward superiorly rotated protracted shoulder posture.   Shoulders: Level   Head: In neutral     Vascular upper extremity:   Right radial pulses: 2+   Left radial pulses: 2+      Neurological Upper Extremity:    Light Touch: Intact in Bilateral UE except:  Increased in the right middle finger.  Decreased in the right shoulder, right upper lateral arm, right medial forearm, right ulnar dorsal aspect of the hand, right radial dorsal aspect of the hand, right thumb, and right index finger.   Pin Prick: Not tested.   UE Muscle Strength: All Upper Extremity strength measurements 5/5   Reflexes: 2+ In the bilateral upper extremities.   Lucero's sign Right: Negative   Lucero's sigh Left: Negative     C-Spine Special Tests  Special Tests: Tinel's sign is negative at the bilateral wrists  Phalen's test is negative bilaterally     Radiology Imaging:  I reviewed with the patient her X-ray of the cervical spine from 5/2/2024 which was normal.      Assessment  1. Numbness of right hand    2. Cervical disc disease: C5-6 mild    3. Status post lumbar spine surgery for decompression of spinal cord at L5-S1    4. S/P lumbar microdiscectomy at L5-S1        Plan  I will do an EMG of the right arm and hand to assess for the numbness.    She will try using a cock up wrist splint at night and when using the computer to see if this will help her symptoms.    She will follow up after having the EMG test.    The patient understands and agrees with the stated plan.    Sandeep Anderson MD  2/5/2025

## 2025-02-05 NOTE — PATIENT INSTRUCTIONS
Plan  I will do an EMG of the right arm and hand to assess for the numbness.    She will try using a cock up wrist splint at night and when using the computer to see if this will help her symptoms.    She will follow up after having the EMG test.

## 2025-03-12 ENCOUNTER — PROCEDURE VISIT (OUTPATIENT)
Dept: PHYSICAL MEDICINE AND REHAB | Facility: CLINIC | Age: 50
End: 2025-03-12
Payer: COMMERCIAL

## 2025-03-12 DIAGNOSIS — M54.12 CERVICAL RADICULOPATHY: ICD-10-CM

## 2025-03-12 DIAGNOSIS — G56.01 CARPAL TUNNEL SYNDROME OF RIGHT WRIST: Primary | ICD-10-CM

## 2025-03-12 PROCEDURE — 95909 NRV CNDJ TST 5-6 STUDIES: CPT | Performed by: PHYSICAL MEDICINE & REHABILITATION

## 2025-03-12 PROCEDURE — 95886 MUSC TEST DONE W/N TEST COMP: CPT | Performed by: PHYSICAL MEDICINE & REHABILITATION

## 2025-03-19 NOTE — PROCEDURES
[address]          Patient: JORGE CASEY YOB: 1975  Patient ID: [id number] Age: 25 Years 2 Months  Sex: Female Diagnosis: EMG-RUE  History/Phy.Exam:  EMG-RUE. NO THYROID OR DIABETES. INTERMITTENT N/T . RIGHT      Sensory NCS      Nerve / Sites Rec. Site Onset Peak NP Amp PP Amp Dist Akil     ms ms µV µV cm m/s   R MEDIAN - Dig III   1. Wrist III 3.60 4.50 8.0 9.7 14 38.9   R ULNAR - Dig V   1. Wrist Dig V 2.15 3.00 21.3 35.3 14 65.1   R RADIAL - Snuff   1. Forearm Snuff 1.75 2.20 16.6 18.3 10 57.1       Motor NCS      Nerve / Sites Rec. Site Lat Amp Rel Amp Dist Akil     ms mV % cm m/s   R MEDIAN - APB   1. Wrist APB 5.85 9.8 100     2. Elbow APB 9.65 9.7 99.1 20.5 53.9   R ULNAR - ADM   1. Wrist ADM 2.90 12.3 100 8    2. B.Elbow ADM 5.90 11.7 95.1 18.5 61.7   3. A.Elbow ADM 7.55 11.5 93.2 8.5 51.5       EMG Summary Table     Spontaneous MUAP Recruitment    IA Fib PSW Fasc H.F. Amp Dur. PPP Pattern   R. ABD POLL BREVIS N None None None None 1+ N N N   R. FIRST D INTEROSS N None None None None 1+ N N N   R. EXT INDICIS N None None None None 1+ N N N   R. FLEX CARPI RAD N None None None None N N N N   R. BICEPS N None None None None N N N N   R. TRICEPS N None None None None N N N N     IMPRESSION:  This is an abnormal study.  There is electrodiagnostic evidence of  Right moderate median nerve slowing across the wrist which is consistent with moderate carpal tunnel syndrome.  Right C8 chronic radiculopathy.  There is no peripheral neuropathy.  There is no myopathy.  There is no right brachial plexopathy.      Sandeep Anderson M.D.  Diplomate, American board of Physical Medicine and Rehabilitation

## (undated) DEVICE — SUTURE ETHLN SZ 2-0 L18IN NONABSORB BLK

## (undated) DEVICE — Device

## (undated) DEVICE — MARKER SUR SKIN ST GENTIAN VLT STD REG TIP

## (undated) DEVICE — DRAPE SHEET LG

## (undated) DEVICE — CAUTERY TIP TEFLON MEGADYNE

## (undated) DEVICE — TUBING MEGADYNE SPECULUM

## (undated) DEVICE — KIT HEMSTAT MTRX 8ML PORCINE GEL HUM THROM

## (undated) DEVICE — PACK DRP UNIV W/ BK TBL MAYO STD BTM TOP SIDE

## (undated) DEVICE — STAPLER SKIN ETHICON GUN PXW35

## (undated) DEVICE — PENCIL ES BTTN SWCH W/ TIP HOLSTER E-Z CLN

## (undated) DEVICE — PACK CDS LAMINECTOMY

## (undated) DEVICE — DRAPE C ARM TRNSPAR POLY PROTCT BARR FOR UNIV

## (undated) DEVICE — SPONGE GZ 4XL4IN 100% COT 12 PLY TYP VII WVN

## (undated) DEVICE — ELECTRODE ES L2.75IN XLN STD BLDE MOD E-Z CLN

## (undated) NOTE — LETTER
8/27/2019      Cody Flannery MD  Physical Medicine and Rehabilitation  [address] [address]  Dept: [phone]  Dept Fax: [phone]        RE: Consultation for [facility]        Dear Xu Donald DO,    Thank you

## (undated) NOTE — LETTER
1/11/2023      Rey Foley MD  Physical Medicine and Rehabilitation  [facility], [address] [place] [address]  Dept: [phone]  Dept Fax: [phone]        RE: Consultation for Jarod Taylor        Dear Silke Carson, DO,    Thank you very much for the opportunity to see your patient. Attached please find a summary from your patient's recent visit. I appreciate the chance to take care of your patient with you. Please feel free to call me with any questions or concerns. Sincerely,        Lea Apple.  Talita Foley MD  Electronically Signed on 1/11/2023

## (undated) NOTE — LETTER
6/23/2020      Grisel Riley MD  Physical Medicine and Rehabilitation  [address] [address]  Dept: [phone]  Dept Fax: [phone]        RE: Consultation for [facility]        Dear Ashlee Wong DO,    Thank you

## (undated) NOTE — LETTER
8/3/2023              Carmen Hylton  :  1975        To Whom It May Concern: This patient was seen for a procedure on 23. She will need to be excused from work while she recovers. She may return work on 2023. If this office may be of further assistance, please do not hesitate to contact us. Phone #: [phone]  Fax #: [phone]         Sincerely,         Arline Johnson.  MD Justin  Physical Medicine and Tyra Escobedo

## (undated) NOTE — LETTER
01/10/24  RE: Cher Goldman     : 1975    Dear Dr. Gonzalez,    This letter is to inform you that your patient has been scheduled for surgery with Dr. Dominique on 24 at [facility]. Pre-operative clearance has been requested.  We have asked the patient to contact your office to schedule a pre-operative visit.     Diagnosis: lumbar radiculopathy  Procedure: MINIMALLY INVASIVE LUMBAR 5 TO SACRAL 1 BILATERAL LAMINOFORAMINOTOMIES APPROACHING FROM THE LEFT, MICRODISCECTOMY      A Pre-operative History & Physical is needed for medical clearance within 30 days of surgery. Please address patient's active problems and potential risks of having surgery considering their medical history.  Pre-op labs are scheduled through the [facility] Pre-Admission department. If any labs/testing are being done through the PCP office, then results should be faxed to the pre-admission testing department at [facility] at [phone]. Our pre-operative lab orders are located in our surgery order, if the patient would like these done through your office, you will need to place separate orders.     Please fax clearance letter/office visit note to our office at fax #: [phone]. Your office note must clearly indicate if the patient is medically cleared for surgery or not.    The following orders will be placed by pre-admission testing:  CBC  CMP  Type and Screen   PT/PTT/INR  MRSA/MSSA Nasal Swab  (*And any other pertinent testing based on patient's current clinical condition.)    If you have any questions, you may contact our office at [phone], option # 2.    Thank you,  Georgia HEMPHILL RN, BSN  Clinical Nurse Lead  [facility]

## (undated) NOTE — LETTER
23          Gerry Layton  :  1975      To Whom It May Concern: This patient was seen for a procedure on 23. She will need to be excused from work today and tomorrow. She may return work on 8/3/2023. If this office may be of further assistance, please do not hesitate to contact us. Sincerely,        Michelle Cordero MD

## (undated) NOTE — LETTER
[facility] SURGERY SCHEDULING FORM   [address] [place] [address] [place] [address]   [phone] (scheduling phone) [phone] (scheduling fax)     PATIENT INFORMATION   Last Name:      [address] [place] [address]      First Name:    Terry Moss Completed by:     Karolyn PALACIOS      Date:    8/27/2019

## (undated) NOTE — LETTER
2/5/2025      Sandeep Anderson MD  Physical Medicine and Rehabilitation  [address] [place] [address], [address]  [facility] [address]  Dept: [phone]  Dept Fax: [phone]        RE: Consultation for Cher Goldman        Dear Meg Gonzalez DO,    Thank you very much for the opportunity to see your patient.  Attached please find a summary from your patient's recent visit.     I appreciate the chance to take care of your patient with you.  Please feel free to call me with any questions or concerns.    Sincerely,        Sandeep Anderson MD  Electronically Signed on 2/5/2025

## (undated) NOTE — LETTER
23          Farida Lujan  :  1975      To Whom It May Concern: This patient was seen in our office on 23. Please excuse patient from any missed absences from work on 23. If this office may be of further assistance, please do not hesitate to contact us. Sincerely,  Zora Dawson M.D.   [facility]  Electronically signed on 2023 at 12:55PM.

## (undated) NOTE — LETTER
8/5/2021      Mary Shirley MD  Physical Medicine and Rehabilitation  [address] [address]  Dept: [phone]  Dept Fax: [phone]        RE: Consultation for Kwadwo Aguayo        Dear Rob Colon DO,    Thank you

## (undated) NOTE — LETTER
9/10/2019      Adria Owusu MD  Physical Medicine and Rehabilitation  [address] [address]  Dept: [phone]  Dept Fax: [phone]        RE: Consultation for [facility]        Dear Skip Zimmerman DO,    Thank you

## (undated) NOTE — LETTER
01/10/24    Wenceslao Kwon,    Here are the pre-op instructions we discussed on the phone.  If you have any questions or concerns, please contact our office at [phone] #2 or via [employer] message.    You are scheduled for MINIMALLY INVASIVE LUMBAR 5 TO SACRAL 1 BILATERAL LAMINOFORAMINOTOMIES APPROACHING FROM THE LEFT, MICRODISCECTOMY  on 2-6-24 with  at [facility].     PCP clearance is needed within 30 days of surgery.  We have faxed a request for pre-op clearance to your primary care physician Dr Gonzalez. Please contact their office for appointment.  Please schedule this appointment AT LEAST 1 WEEK PRIOR TO SURGERY DATE.  Your PCP may order additional testing or clearance from another specialist.   You will have an appointment with our RN Spine Navigator prior to surgery.  This is an educational telehealth/phone visit in which you will receive more information on the specifics of your surgery, instructions for before surgery, what to expect in the hospital and how to take care of yourself after surgery.  Your surgeon wants you to to participate in this visit so that you are as prepared for surgery as possible and have an opportunity to ask questions.  If possible, we would like your care partner to be present for the visit as well.    You will need to contact the Pre-admission department at [phone]. The Pre-Admission nurse will review your health history and give you information for day-of instructions. The nurse will also get you scheduled for all your pre-op testing required for your surgery.   You will need pre-operative labs which will include blood work and a MRSA/MSSA test (nasal swab). You will need for fast for the blood work. You may also need an EKG and/or chest x-ray depending on your current health status.    You should have nothing to eat or drink after midnight the night prior to surgery except for the following:   Do drink 12 ounces of regular Gatorade (NOT RED) 12 hours and 4  hours prior to your scheduled surgery time. Do not drink any other liquids (including water) before your surgery. Take 1000 mg of Tylenol (Acetaminophen) 4 hours before your scheduled surgery time, take this with your scheduled Gatorade.    In order to prevent infection, you will need to purchase Hibiclens soap and use it after your regular body soap for 5 days prior to your procedure.  The last shower should be the night before surgery.  This soap can be found at any pharmacy in the first aid section. See detailed instructions below.    Our office will get prior authorization for surgery through your insurance.   Surgery is usually scheduled as 1-2 day admission.  This is an estimate and varies from person to person.  Ultimately, the surgeon will determine when you are ready to be discharged.  The hospital will contact you 1-2 days before surgery with your arrival time.       If you require FMLA or disability paperwork for your recovery, please make sure to either drop it off or have it faxed to our office at [phone]. Be sure the form has your name and date of birth on it.  The form will be faxed to our Forms Department and they will complete it for you.  There is a 25$ fee for all forms that need to be filled out.  Please be aware there is a 10-14 day turnaround time.  You will need to sign a release of information (VALERIE) form if your paperwork does not come with one.  You may call the Forms Department with any questions at [phone].  Their fax number is [phone].     POST OP CARE--First Two Weeks  No bending at waist, twisting, pushing or pulling  No lifting more than 10 lb (a gallon of milk)  Wear cervical collar/lumbar brace, if prescribed, as instructed by surgeon  No overhead reaching  No driving until approved by your surgeon   Change positions frequently. No prolonged sitting or standing.  Increase walking as tolerated to avoid blood clots  No NSAIDs until approved by surgeon (ibuprofen, aleve,  advil, meloxicam, Celebrex, diclofenac etc).   Remove any bandage on post op day 3.  Leave incision open to air.  Glue will fall off on its own.  If you have staples or sutures that are not dissolvable, these will be removed at your 2 or 3 week post op visit.   Check your incision for signs of infection daily (redness, warmth, drainage, increased pain at incision site, fever)  Do not shower until post op day 3.  Do not allow water pressure to directly hit the incision.  Do not scrub the incision and avoid any lotion, creams or perfume near the incision site.  No swimming, hot tubs or baths until your incision is completely healed.  Take pain medication as prescribed, if needed.  Constipation is a common side effect of opioids.  If you experience constipation, drink plenty of water and take over-the-counter stool softeners daily.   Avoid nicotine and alcohol   When to call the office:  Increased or change in location of pain  Increased weakness in arms or legs  Incision drainage, redness or warmth  Fever  Bowel or bladder changes   Choking on food or liquids (cervical)  Pain, redness, swelling, color changes or warmth in lower leg    Hibiclens Bathing  Hibiclens is a body soap that is used before surgery to protect you from getting an infection post-operatively  Hibiclens comes in a large blue bottle and can be found in most pharmacies in the First Aid supplies  Shower with this daily for FIVE consecutive days before surgery, using the entire bottle over the five days.  The last shower should be the night before surgery.   Steps to bathing with Hibiclens  Do not use Hibiclens on your hair, face or private areas  Wash your hair and body as normal with your usual cleansers  Rinse well  Using a clean wet washcloth apply enough Hibiclens to cover your body. Wash from the neck down avoiding the genital areas and concentrating on the surgical area  Rinse well  Dry yourself with a clean, dry towel  Do not use any powders,  creams, lotions or sprays on your body as these attract bacteria  Deodorant, hand lotion and facial creams are acceptable.     If you have any questions or concerns, please contact our office at [phone] #2 or via [employer] message.  Thank you,  Georgia HEMPHILL RN, BSN  Clinical Nurse Lead  [facility]

## (undated) NOTE — LETTER
8/27/2019      Genette Cables A. Viann Meckel, MD  Physical Medicine and Rehabilitation  [address] [address]  Dept: [phone]  Dept Fax: [phone]        RE: Consultation for [facility]        Dear Beto Lopez DO,    Thank you

## (undated) NOTE — LETTER
4/29/2021      Aimee Valdes MD  Physical Medicine and Rehabilitation  [address] [facility], [address] [place] [address]  Dept: [phone]  Dept Fax: [phone]        RE: Consultation for Alicia Soliz        Dear Grant Sheriff DO,    Thank you

## (undated) NOTE — LETTER
[facility] [address] , [place]   Date:   7/17/2023     Name:   Rosario Bragg    YOB: 1975   MRN:   [id number]       WHERE IS YOUR PAIN NOW? Earnestine the areas on your body where you feel the described sensations. Use the appropriate symbol. Kunal Renee the areas of radiation. Include all affected areas. Just to complete the picture, please draw in the face. ACHE:  ^ ^ ^   NUMBNESS:  0000   PINS & NEEDLES:  = = = =                              ^ ^ ^                       0000              = = = =                                    ^ ^ ^                       0000            = = = =      BURNING:  XXXX   STABBING: ////                  XXXX                ////                         XXXX          ////     Please earnestine the line below indicating your degree of pain right now  with 0 being no pain 10 being the worst pain possible.                                          0             1             2              3             4              5              6              7             8             9             10         Patient Signature:

## (undated) NOTE — LETTER
23          Vesna Feroz  :  1975      To Whom It May Concern: This patient was seen in our office on 23 . She needs to be excused from jury duty due to her medical condition. If this office may be of further assistance, please do not hesitate to contact us. Sincerely,        Gordan Hodgkins.  Shayna Barrientos MD

## (undated) NOTE — LETTER
3/19/2025      Sandeep Anderson MD  Physical Medicine and Rehabilitation  [address] [place] [address], [address]  [facility] [address]  Dept: [phone]  Dept Fax: [phone]        RE: Consultation for Cher Goldman        Dear Meg Gonzalez DO,    Thank you very much for the opportunity to see your patient.  Attached please find a summary from your patient's recent visit.     I appreciate the chance to take care of your patient with you.  Please feel free to call me with any questions or concerns.    Sincerely,        Sandeep Anderson MD  Electronically Signed on 3/19/2025

## (undated) NOTE — LETTER
5/13/2020      Lauri Beltran MD  Physical Medicine and Rehabilitation  [address] [address]  Dept: [phone]  Dept Fax: [phone]        RE: Consultation for [facility]        Dear Zabrina Selby, DO,    Thank you

## (undated) NOTE — LETTER
23          Dejah Ramirez  :  1975      To Whom It May Concern: This letter is to inform this patient is currently being prescribed HYDROcodone-acetaminophen 7.5-325 MG Oral Tablets under my care. If this office may be of further assistance, please do not hesitate to contact us.       Sincerely,    Shiv Garsia MD

## (undated) NOTE — LETTER
1/21/2021      Mitzi Guzman MD  Physical Medicine and Rehabilitation  [address] [facility], [address] [place] [address]  Dept: [phone]  Dept Fax: [phone]        RE: Consultation for Ben Ireland        Dear Juan M Cueto, DO,    Thank you

## (undated) NOTE — LETTER
7/17/2023      Andrea Anderson MD  Physical Medicine and Rehabilitation  2010 [facility], [address] [place] [address]  Dept: [phone]  Dept Fax: [phone]        RE: Consultation for Fe Whatley        Dear No primary care provider on file.,    Thank you very much for the opportunity to see your patient. Attached please find a summary from your patient's recent visit. I appreciate the chance to take care of your patient with you. Please feel free to call me with any questions or concerns. Sincerely,        Stacia Matthew.  Naoma Dance, MD  Electronically Signed on 7/17/2023